# Patient Record
Sex: MALE | Race: BLACK OR AFRICAN AMERICAN | URBAN - METROPOLITAN AREA
[De-identification: names, ages, dates, MRNs, and addresses within clinical notes are randomized per-mention and may not be internally consistent; named-entity substitution may affect disease eponyms.]

---

## 2017-11-13 ENCOUNTER — HOSPITAL ENCOUNTER (OUTPATIENT)
Dept: LAB | Facility: CLINIC | Age: 57
Discharge: HOME | End: 2017-11-13
Attending: SURGERY | Admitting: ORTHOPAEDIC SURGERY
Payer: COMMERCIAL

## 2017-11-13 DIAGNOSIS — Z01.812 PRE-OPERATIVE LABORATORY EXAMINATION: ICD-10-CM

## 2017-11-13 LAB
MRSA DNA SPEC QL NAA+PROBE: NEGATIVE
SPECIMEN SOURCE: NORMAL

## 2017-11-13 PROCEDURE — 40000830 ZZHCL STATISTIC STAPH AUREUS METH RESIST SCREEN PCR: Performed by: ORTHOPAEDIC SURGERY

## 2017-11-13 PROCEDURE — 87640 STAPH A DNA AMP PROBE: CPT | Performed by: ORTHOPAEDIC SURGERY

## 2017-11-13 PROCEDURE — 40000829 ZZHCL STATISTIC STAPH AUREUS SUSCEPT SCREEN PCR: Performed by: ORTHOPAEDIC SURGERY

## 2017-11-13 PROCEDURE — 87641 MR-STAPH DNA AMP PROBE: CPT | Performed by: ORTHOPAEDIC SURGERY

## 2017-11-16 ENCOUNTER — TRANSFERRED RECORDS (OUTPATIENT)
Dept: HEALTH INFORMATION MANAGEMENT | Facility: CLINIC | Age: 57
End: 2017-11-16

## 2017-12-05 ENCOUNTER — ANESTHESIA EVENT (OUTPATIENT)
Dept: SURGERY | Facility: CLINIC | Age: 57
DRG: 470 | End: 2017-12-05
Payer: COMMERCIAL

## 2017-12-05 ENCOUNTER — ANESTHESIA (OUTPATIENT)
Dept: SURGERY | Facility: CLINIC | Age: 57
DRG: 470 | End: 2017-12-05
Payer: COMMERCIAL

## 2017-12-05 ENCOUNTER — HOSPITAL ENCOUNTER (INPATIENT)
Facility: CLINIC | Age: 57
LOS: 2 days | Discharge: HOME | DRG: 470 | End: 2017-12-07
Attending: ORTHOPAEDIC SURGERY | Admitting: ORTHOPAEDIC SURGERY
Payer: COMMERCIAL

## 2017-12-05 DIAGNOSIS — Z96.651 HISTORY OF ARTHROPLASTY OF RIGHT KNEE: Primary | ICD-10-CM

## 2017-12-05 LAB
CREAT SERPL-MCNC: 0.98 MG/DL (ref 0.66–1.25)
GFR SERPL CREATININE-BSD FRML MDRD: 79 ML/MIN/1.7M2
HGB BLD-MCNC: 14.7 G/DL (ref 13.3–17.7)
POTASSIUM SERPL-SCNC: 3.6 MMOL/L (ref 3.4–5.3)

## 2017-12-05 PROCEDURE — 36000093 ZZH SURGERY LEVEL 4 1ST 30 MIN: Performed by: ORTHOPAEDIC SURGERY

## 2017-12-05 PROCEDURE — 85018 HEMOGLOBIN: CPT | Performed by: ANESTHESIOLOGY

## 2017-12-05 PROCEDURE — 12000007 ZZH R&B INTERMEDIATE

## 2017-12-05 PROCEDURE — 27210995 ZZH RX 272: Performed by: ORTHOPAEDIC SURGERY

## 2017-12-05 PROCEDURE — 36000063 ZZH SURGERY LEVEL 4 EA 15 ADDTL MIN: Performed by: ORTHOPAEDIC SURGERY

## 2017-12-05 PROCEDURE — 25000128 H RX IP 250 OP 636: Performed by: PHYSICIAN ASSISTANT

## 2017-12-05 PROCEDURE — 27210794 ZZH OR GENERAL SUPPLY STERILE: Performed by: ORTHOPAEDIC SURGERY

## 2017-12-05 PROCEDURE — 84132 ASSAY OF SERUM POTASSIUM: CPT | Performed by: ANESTHESIOLOGY

## 2017-12-05 PROCEDURE — 27110028 ZZH OR GENERAL SUPPLY NON-STERILE: Performed by: ORTHOPAEDIC SURGERY

## 2017-12-05 PROCEDURE — 71000012 ZZH RECOVERY PHASE 1 LEVEL 1 FIRST HR: Performed by: ORTHOPAEDIC SURGERY

## 2017-12-05 PROCEDURE — 37000009 ZZH ANESTHESIA TECHNICAL FEE, EACH ADDTL 15 MIN: Performed by: ORTHOPAEDIC SURGERY

## 2017-12-05 PROCEDURE — 25000128 H RX IP 250 OP 636: Performed by: ORTHOPAEDIC SURGERY

## 2017-12-05 PROCEDURE — 25000128 H RX IP 250 OP 636: Performed by: ANESTHESIOLOGY

## 2017-12-05 PROCEDURE — 25800025 ZZH RX 258: Performed by: ORTHOPAEDIC SURGERY

## 2017-12-05 PROCEDURE — 25000128 H RX IP 250 OP 636: Performed by: NURSE ANESTHETIST, CERTIFIED REGISTERED

## 2017-12-05 PROCEDURE — 0SRC0J9 REPLACEMENT OF RIGHT KNEE JOINT WITH SYNTHETIC SUBSTITUTE, CEMENTED, OPEN APPROACH: ICD-10-PCS | Performed by: ORTHOPAEDIC SURGERY

## 2017-12-05 PROCEDURE — 25000128 H RX IP 250 OP 636

## 2017-12-05 PROCEDURE — 27810169 ZZH OR IMPLANT GENERAL: Performed by: ORTHOPAEDIC SURGERY

## 2017-12-05 PROCEDURE — 82565 ASSAY OF CREATININE: CPT | Performed by: ANESTHESIOLOGY

## 2017-12-05 PROCEDURE — 36415 COLL VENOUS BLD VENIPUNCTURE: CPT | Performed by: ANESTHESIOLOGY

## 2017-12-05 PROCEDURE — 25000566 ZZH SEVOFLURANE, EA 15 MIN: Performed by: ORTHOPAEDIC SURGERY

## 2017-12-05 PROCEDURE — C1776 JOINT DEVICE (IMPLANTABLE): HCPCS | Performed by: ORTHOPAEDIC SURGERY

## 2017-12-05 PROCEDURE — 37000008 ZZH ANESTHESIA TECHNICAL FEE, 1ST 30 MIN: Performed by: ORTHOPAEDIC SURGERY

## 2017-12-05 PROCEDURE — 40000171 ZZH STATISTIC PRE-PROCEDURE ASSESSMENT III: Performed by: ORTHOPAEDIC SURGERY

## 2017-12-05 PROCEDURE — 25000125 ZZHC RX 250: Performed by: NURSE ANESTHETIST, CERTIFIED REGISTERED

## 2017-12-05 PROCEDURE — 25000125 ZZHC RX 250

## 2017-12-05 PROCEDURE — 25000132 ZZH RX MED GY IP 250 OP 250 PS 637: Performed by: ORTHOPAEDIC SURGERY

## 2017-12-05 PROCEDURE — 25000125 ZZHC RX 250: Performed by: ANESTHESIOLOGY

## 2017-12-05 PROCEDURE — 71000013 ZZH RECOVERY PHASE 1 LEVEL 1 EA ADDTL HR: Performed by: ORTHOPAEDIC SURGERY

## 2017-12-05 DEVICE — IMP COMP PATELLA GENESIS II 9X35MM 71420578: Type: IMPLANTABLE DEVICE | Site: KNEE | Status: FUNCTIONAL

## 2017-12-05 DEVICE — IMP BONE CEMENT SIMPLEX W/GENTAMICIN 6195-1-001: Type: IMPLANTABLE DEVICE | Site: KNEE | Status: FUNCTIONAL

## 2017-12-05 DEVICE — BONE CEMENT RADIOPAQUE SIMPLEX HV FULL DOSE 6194-1-001: Type: IMPLANTABLE DEVICE | Site: KNEE | Status: FUNCTIONAL

## 2017-12-05 DEVICE — IMP COMP FEMORAL S&N LEGION PS NP SZ7 RT 71423237: Type: IMPLANTABLE DEVICE | Site: KNEE | Status: FUNCTIONAL

## 2017-12-05 DEVICE — IMP INSERT TIB S&N LGN PS HI FLEX XLPE SZ5-6 15MM 71453224: Type: IMPLANTABLE DEVICE | Site: KNEE | Status: FUNCTIONAL

## 2017-12-05 DEVICE — IMP BASEPLATE TIBIAL GENESIS II SZ 6 RT TI 71420188: Type: IMPLANTABLE DEVICE | Site: KNEE | Status: FUNCTIONAL

## 2017-12-05 RX ORDER — HYDROMORPHONE HYDROCHLORIDE 1 MG/ML
.3-.5 INJECTION, SOLUTION INTRAMUSCULAR; INTRAVENOUS; SUBCUTANEOUS EVERY 5 MIN PRN
Status: DISCONTINUED | OUTPATIENT
Start: 2017-12-05 | End: 2017-12-05 | Stop reason: HOSPADM

## 2017-12-05 RX ORDER — SODIUM CHLORIDE, SODIUM LACTATE, POTASSIUM CHLORIDE, CALCIUM CHLORIDE 600; 310; 30; 20 MG/100ML; MG/100ML; MG/100ML; MG/100ML
INJECTION, SOLUTION INTRAVENOUS CONTINUOUS
Status: DISCONTINUED | OUTPATIENT
Start: 2017-12-05 | End: 2017-12-05 | Stop reason: HOSPADM

## 2017-12-05 RX ORDER — ONDANSETRON 2 MG/ML
4 INJECTION INTRAMUSCULAR; INTRAVENOUS EVERY 6 HOURS PRN
Status: DISCONTINUED | OUTPATIENT
Start: 2017-12-05 | End: 2017-12-07 | Stop reason: HOSPADM

## 2017-12-05 RX ORDER — ACETAMINOPHEN 325 MG/1
975 TABLET ORAL EVERY 8 HOURS
Status: DISCONTINUED | OUTPATIENT
Start: 2017-12-05 | End: 2017-12-07 | Stop reason: HOSPADM

## 2017-12-05 RX ORDER — FENTANYL CITRATE 50 UG/ML
INJECTION, SOLUTION INTRAMUSCULAR; INTRAVENOUS PRN
Status: DISCONTINUED | OUTPATIENT
Start: 2017-12-05 | End: 2017-12-05

## 2017-12-05 RX ORDER — ATORVASTATIN CALCIUM 10 MG/1
10 TABLET, FILM COATED ORAL EVERY MORNING
Status: DISCONTINUED | OUTPATIENT
Start: 2017-12-06 | End: 2017-12-07 | Stop reason: HOSPADM

## 2017-12-05 RX ORDER — CEFAZOLIN SODIUM 2 G/100ML
2 INJECTION, SOLUTION INTRAVENOUS
Status: COMPLETED | OUTPATIENT
Start: 2017-12-05 | End: 2017-12-05

## 2017-12-05 RX ORDER — ONDANSETRON 2 MG/ML
4 INJECTION INTRAMUSCULAR; INTRAVENOUS EVERY 30 MIN PRN
Status: DISCONTINUED | OUTPATIENT
Start: 2017-12-05 | End: 2017-12-05 | Stop reason: HOSPADM

## 2017-12-05 RX ORDER — CHLORTHALIDONE 25 MG/1
25 TABLET ORAL EVERY MORNING
Status: DISCONTINUED | OUTPATIENT
Start: 2017-12-06 | End: 2017-12-07 | Stop reason: HOSPADM

## 2017-12-05 RX ORDER — LIDOCAINE HYDROCHLORIDE 20 MG/ML
INJECTION, SOLUTION INFILTRATION; PERINEURAL PRN
Status: DISCONTINUED | OUTPATIENT
Start: 2017-12-05 | End: 2017-12-05

## 2017-12-05 RX ORDER — KETOROLAC TROMETHAMINE 30 MG/ML
30 INJECTION, SOLUTION INTRAMUSCULAR; INTRAVENOUS EVERY 6 HOURS
Status: DISCONTINUED | OUTPATIENT
Start: 2017-12-05 | End: 2017-12-05

## 2017-12-05 RX ORDER — ONDANSETRON 4 MG/1
4 TABLET, ORALLY DISINTEGRATING ORAL EVERY 6 HOURS PRN
Status: DISCONTINUED | OUTPATIENT
Start: 2017-12-05 | End: 2017-12-07 | Stop reason: HOSPADM

## 2017-12-05 RX ORDER — DEXTROSE MONOHYDRATE, SODIUM CHLORIDE, AND POTASSIUM CHLORIDE 50; 1.49; 4.5 G/1000ML; G/1000ML; G/1000ML
INJECTION, SOLUTION INTRAVENOUS CONTINUOUS
Status: DISCONTINUED | OUTPATIENT
Start: 2017-12-05 | End: 2017-12-07 | Stop reason: HOSPADM

## 2017-12-05 RX ORDER — ONDANSETRON 2 MG/ML
INJECTION INTRAMUSCULAR; INTRAVENOUS PRN
Status: DISCONTINUED | OUTPATIENT
Start: 2017-12-05 | End: 2017-12-05

## 2017-12-05 RX ORDER — DIAZEPAM 5 MG
5 TABLET ORAL EVERY 6 HOURS PRN
Status: DISCONTINUED | OUTPATIENT
Start: 2017-12-05 | End: 2017-12-07 | Stop reason: HOSPADM

## 2017-12-05 RX ORDER — FENTANYL CITRATE 50 UG/ML
50 INJECTION, SOLUTION INTRAMUSCULAR; INTRAVENOUS
Status: COMPLETED | OUTPATIENT
Start: 2017-12-05 | End: 2017-12-05

## 2017-12-05 RX ORDER — MAGNESIUM HYDROXIDE 1200 MG/15ML
LIQUID ORAL PRN
Status: DISCONTINUED | OUTPATIENT
Start: 2017-12-05 | End: 2017-12-05 | Stop reason: HOSPADM

## 2017-12-05 RX ORDER — AMOXICILLIN 250 MG
1-2 CAPSULE ORAL 2 TIMES DAILY
Status: DISCONTINUED | OUTPATIENT
Start: 2017-12-05 | End: 2017-12-07 | Stop reason: HOSPADM

## 2017-12-05 RX ORDER — KETOROLAC TROMETHAMINE 30 MG/ML
30 INJECTION, SOLUTION INTRAMUSCULAR; INTRAVENOUS EVERY 6 HOURS
Status: COMPLETED | OUTPATIENT
Start: 2017-12-05 | End: 2017-12-06

## 2017-12-05 RX ORDER — KETOROLAC TROMETHAMINE 30 MG/ML
INJECTION, SOLUTION INTRAMUSCULAR; INTRAVENOUS PRN
Status: DISCONTINUED | OUTPATIENT
Start: 2017-12-05 | End: 2017-12-05

## 2017-12-05 RX ORDER — HYDROMORPHONE HYDROCHLORIDE 1 MG/ML
INJECTION, SOLUTION INTRAMUSCULAR; INTRAVENOUS; SUBCUTANEOUS
Status: COMPLETED
Start: 2017-12-05 | End: 2017-12-05

## 2017-12-05 RX ORDER — ONDANSETRON 4 MG/1
4 TABLET, ORALLY DISINTEGRATING ORAL EVERY 30 MIN PRN
Status: DISCONTINUED | OUTPATIENT
Start: 2017-12-05 | End: 2017-12-05 | Stop reason: HOSPADM

## 2017-12-05 RX ORDER — ACETAMINOPHEN 325 MG/1
650 TABLET ORAL EVERY 4 HOURS PRN
Status: DISCONTINUED | OUTPATIENT
Start: 2017-12-08 | End: 2017-12-07 | Stop reason: HOSPADM

## 2017-12-05 RX ORDER — CEFAZOLIN SODIUM 2 G/100ML
2 INJECTION, SOLUTION INTRAVENOUS EVERY 8 HOURS
Status: COMPLETED | OUTPATIENT
Start: 2017-12-05 | End: 2017-12-06

## 2017-12-05 RX ORDER — HYDROMORPHONE HYDROCHLORIDE 1 MG/ML
.5-1 INJECTION, SOLUTION INTRAMUSCULAR; INTRAVENOUS; SUBCUTANEOUS
Status: DISCONTINUED | OUTPATIENT
Start: 2017-12-05 | End: 2017-12-07 | Stop reason: HOSPADM

## 2017-12-05 RX ORDER — LIDOCAINE 40 MG/G
CREAM TOPICAL
Status: DISCONTINUED | OUTPATIENT
Start: 2017-12-05 | End: 2017-12-07 | Stop reason: HOSPADM

## 2017-12-05 RX ORDER — EPHEDRINE SULFATE 50 MG/ML
INJECTION, SOLUTION INTRAMUSCULAR; INTRAVENOUS; SUBCUTANEOUS PRN
Status: DISCONTINUED | OUTPATIENT
Start: 2017-12-05 | End: 2017-12-05

## 2017-12-05 RX ORDER — ASPIRIN 325 MG
325 TABLET ORAL 2 TIMES DAILY
Status: DISCONTINUED | OUTPATIENT
Start: 2017-12-05 | End: 2017-12-07 | Stop reason: HOSPADM

## 2017-12-05 RX ORDER — PROPOFOL 10 MG/ML
INJECTION, EMULSION INTRAVENOUS PRN
Status: DISCONTINUED | OUTPATIENT
Start: 2017-12-05 | End: 2017-12-05

## 2017-12-05 RX ORDER — FENTANYL CITRATE 50 UG/ML
25-50 INJECTION, SOLUTION INTRAMUSCULAR; INTRAVENOUS
Status: DISCONTINUED | OUTPATIENT
Start: 2017-12-05 | End: 2017-12-05 | Stop reason: HOSPADM

## 2017-12-05 RX ORDER — OXYCODONE HCL 10 MG/1
10 TABLET, FILM COATED, EXTENDED RELEASE ORAL EVERY 12 HOURS
Status: COMPLETED | OUTPATIENT
Start: 2017-12-05 | End: 2017-12-07

## 2017-12-05 RX ORDER — NALOXONE HYDROCHLORIDE 0.4 MG/ML
.1-.4 INJECTION, SOLUTION INTRAMUSCULAR; INTRAVENOUS; SUBCUTANEOUS
Status: DISCONTINUED | OUTPATIENT
Start: 2017-12-05 | End: 2017-12-07 | Stop reason: HOSPADM

## 2017-12-05 RX ORDER — OXYCODONE HYDROCHLORIDE 5 MG/1
5-10 TABLET ORAL
Status: DISCONTINUED | OUTPATIENT
Start: 2017-12-05 | End: 2017-12-07 | Stop reason: HOSPADM

## 2017-12-05 RX ORDER — DEXAMETHASONE SODIUM PHOSPHATE 4 MG/ML
INJECTION, SOLUTION INTRA-ARTICULAR; INTRALESIONAL; INTRAMUSCULAR; INTRAVENOUS; SOFT TISSUE PRN
Status: DISCONTINUED | OUTPATIENT
Start: 2017-12-05 | End: 2017-12-05

## 2017-12-05 RX ADMIN — LIDOCAINE HYDROCHLORIDE 0.2 ML: 10 INJECTION, SOLUTION EPIDURAL; INFILTRATION; INTRACAUDAL; PERINEURAL at 09:52

## 2017-12-05 RX ADMIN — FENTANYL CITRATE 50 MCG: 50 INJECTION INTRAMUSCULAR; INTRAVENOUS at 13:54

## 2017-12-05 RX ADMIN — SENNOSIDES AND DOCUSATE SODIUM 1 TABLET: 8.6; 5 TABLET ORAL at 21:36

## 2017-12-05 RX ADMIN — Medication 0.5 MG: at 15:56

## 2017-12-05 RX ADMIN — MIDAZOLAM HYDROCHLORIDE 2 MG: 1 INJECTION, SOLUTION INTRAMUSCULAR; INTRAVENOUS at 11:59

## 2017-12-05 RX ADMIN — Medication 5 MG: at 12:38

## 2017-12-05 RX ADMIN — LIDOCAINE HYDROCHLORIDE: 20 JELLY TOPICAL at 18:02

## 2017-12-05 RX ADMIN — FENTANYL CITRATE 100 MCG: 50 INJECTION, SOLUTION INTRAMUSCULAR; INTRAVENOUS at 10:10

## 2017-12-05 RX ADMIN — PROPOFOL 200 MG: 10 INJECTION, EMULSION INTRAVENOUS at 12:08

## 2017-12-05 RX ADMIN — ACETAMINOPHEN 975 MG: 325 TABLET, FILM COATED ORAL at 21:36

## 2017-12-05 RX ADMIN — DEXAMETHASONE SODIUM PHOSPHATE 4 MG: 4 INJECTION, SOLUTION INTRA-ARTICULAR; INTRALESIONAL; INTRAMUSCULAR; INTRAVENOUS; SOFT TISSUE at 12:14

## 2017-12-05 RX ADMIN — CEFAZOLIN SODIUM 2 G: 2 INJECTION, SOLUTION INTRAVENOUS at 19:52

## 2017-12-05 RX ADMIN — HYDROMORPHONE HYDROCHLORIDE 0.5 MG: 1 INJECTION, SOLUTION INTRAMUSCULAR; INTRAVENOUS; SUBCUTANEOUS at 14:15

## 2017-12-05 RX ADMIN — SODIUM CHLORIDE, POTASSIUM CHLORIDE, SODIUM LACTATE AND CALCIUM CHLORIDE: 600; 310; 30; 20 INJECTION, SOLUTION INTRAVENOUS at 13:10

## 2017-12-05 RX ADMIN — OXYCODONE HYDROCHLORIDE 10 MG: 5 TABLET ORAL at 18:05

## 2017-12-05 RX ADMIN — PROPOFOL 30 MG: 10 INJECTION, EMULSION INTRAVENOUS at 13:08

## 2017-12-05 RX ADMIN — MIDAZOLAM HYDROCHLORIDE 2 MG: 1 INJECTION, SOLUTION INTRAMUSCULAR; INTRAVENOUS at 10:10

## 2017-12-05 RX ADMIN — FENTANYL CITRATE 25 MCG: 50 INJECTION INTRAMUSCULAR; INTRAVENOUS at 14:24

## 2017-12-05 RX ADMIN — CEFAZOLIN SODIUM 2 G: 2 INJECTION, SOLUTION INTRAVENOUS at 12:13

## 2017-12-05 RX ADMIN — LIDOCAINE HYDROCHLORIDE 100 MG: 20 INJECTION, SOLUTION INFILTRATION; PERINEURAL at 12:08

## 2017-12-05 RX ADMIN — KETOROLAC TROMETHAMINE 30 MG: 30 INJECTION, SOLUTION INTRAMUSCULAR at 13:24

## 2017-12-05 RX ADMIN — FENTANYL CITRATE 100 MCG: 50 INJECTION, SOLUTION INTRAMUSCULAR; INTRAVENOUS at 12:08

## 2017-12-05 RX ADMIN — PROPOFOL 60 MG: 10 INJECTION, EMULSION INTRAVENOUS at 13:06

## 2017-12-05 RX ADMIN — OXYCODONE HYDROCHLORIDE 10 MG: 10 TABLET, FILM COATED, EXTENDED RELEASE ORAL at 18:05

## 2017-12-05 RX ADMIN — HYDROMORPHONE HYDROCHLORIDE 0.5 MG: 1 INJECTION, SOLUTION INTRAMUSCULAR; INTRAVENOUS; SUBCUTANEOUS at 14:35

## 2017-12-05 RX ADMIN — HYDROMORPHONE HYDROCHLORIDE 0.5 MG: 1 INJECTION, SOLUTION INTRAMUSCULAR; INTRAVENOUS; SUBCUTANEOUS at 13:48

## 2017-12-05 RX ADMIN — PROPOFOL 10 MG: 10 INJECTION, EMULSION INTRAVENOUS at 13:09

## 2017-12-05 RX ADMIN — KETOROLAC TROMETHAMINE 30 MG: 30 INJECTION, SOLUTION INTRAMUSCULAR at 13:23

## 2017-12-05 RX ADMIN — SODIUM CHLORIDE, POTASSIUM CHLORIDE, SODIUM LACTATE AND CALCIUM CHLORIDE: 600; 310; 30; 20 INJECTION, SOLUTION INTRAVENOUS at 09:53

## 2017-12-05 RX ADMIN — ASPIRIN 325 MG ORAL TABLET 325 MG: 325 PILL ORAL at 21:36

## 2017-12-05 RX ADMIN — POTASSIUM CHLORIDE, DEXTROSE MONOHYDRATE AND SODIUM CHLORIDE: 150; 5; 450 INJECTION, SOLUTION INTRAVENOUS at 16:00

## 2017-12-05 RX ADMIN — ONDANSETRON 4 MG: 2 INJECTION INTRAMUSCULAR; INTRAVENOUS at 13:20

## 2017-12-05 RX ADMIN — OXYCODONE HYDROCHLORIDE 10 MG: 5 TABLET ORAL at 21:36

## 2017-12-05 RX ADMIN — KETOROLAC TROMETHAMINE 30 MG: 30 INJECTION, SOLUTION INTRAMUSCULAR at 19:52

## 2017-12-05 NOTE — ANESTHESIA CARE TRANSFER NOTE
Patient: Gasper Chacon    Procedure(s):  RIGHT TOTAL KNEE ARTHROPLASTY  - Wound Class: I-Clean    Diagnosis: DJD RIGHT KNEE   Diagnosis Additional Information: No value filed.    Anesthesia Type:   General, LMA     Note:  Airway :Face Mask  Patient transferred to:PACU  Comments: Level of Conscious:Awake  Vital Signs   BP:116/72   HR:85   RR:18   O2 Saturation:98   Oxygen LPM:8   Temp:98.3  Dentition:Unchanged from preop  Patient Status:Stable  Report to PACU RN.Handoff Report: Identifed the Patient, Identified the Reponsible Provider, Reviewed the pertinent medical history, Discussed the surgical course, Reviewed Intra-OP anesthesia mangement and issues during anesthesia, Set expectations for post-procedure period and Allowed opportunity for questions and acknowledgement of understanding      Vitals: (Last set prior to Anesthesia Care Transfer)    CRNA VITALS  12/5/2017 1311 - 12/5/2017 1347      12/5/2017             Resp Rate (set): 10                Electronically Signed By: Christine Marie Volp Hodgkins, CRNA, APRN CRNA  December 5, 2017  1:47 PM

## 2017-12-05 NOTE — IP AVS SNAPSHOT
[address] [facility]    [address] [place] [address]    Phone:  [phone]                                       After Visit Summary   12/5/2017    Gasper Chacon    MRN: [id number]           After Visit Summary Signature Page     I have received my discharge instructions, and my questions have been answered. I have discussed any challenges I see with this plan with the nurse or doctor.    ..........................................................................................................................................  Patient/Patient Representative Signature      ..........................................................................................................................................  Patient Representative Print Name and Relationship to Patient    ..................................................               ................................................  Date                                            Time    ..........................................................................................................................................  Reviewed by Signature/Title    ...................................................              ..............................................  Date                                                            Time

## 2017-12-05 NOTE — IP AVS SNAPSHOT
MRN:[id number]                      After Visit Summary   12/5/2017    Gasper Chacon    MRN: [id number]           Thank you!     Thank you for choosing [facility] for your care. Our goal is always to provide you with excellent care. Hearing back from our patients is one way we can continue to improve our services. Please take a few minutes to complete the written survey that you may receive in the mail after you visit with us. Thank you!        Patient Information     Date Of Birth          1960        Designated Caregiver       Most Recent Value    Caregiver    Will someone help with your care after discharge? no [pt primary, wife assist prn]      About your hospital stay     You were admitted on:  December 5, 2017 You last received care in the:  [place] [address] [facility]    You were discharged on:  December 7, 2017        Reason for your hospital stay       Right total knee                  Who to Call     For medical emergencies, please call 911.  For non-urgent questions about your medical care, please call your primary care provider or clinic, [phone]  For questions related to your surgery, please call your surgery clinic        Attending Provider     Provider Specialty    Luis Enrique Begum MD Orthopedics       Primary Care Provider Office Phone # Fax #    Luiza NIGEL Bender [phone]      After Care Instructions     Wound care and dressings       Instructions to care for your wound at home: daily dressing changes  Redress 4x4,abd,thigh high nigel, may shower.                  Follow-up Appointments     Follow-up and recommended labs and tests        arranged                  Your next 10 appointments already scheduled     Dec 08, 2017  1:20 PM CST   (Arrive by 1:05 PM)   CARLOS Extremity with Sudha Mack, PT   [place] [facility] ([facility] [place]  )    [address]  "[address]   [phone]            Dec 11, 2017  1:20 PM CST   CARLOS Extremity with Sudha Mack, PT   [place] [facility] ([facility]  )    [address] [place] [address] [address]  [facility] [address]   [phone]            Dec 14, 2017  4:50 PM CST   CARLOS Extremity with Francisco Daugherty PT   [facility] ([facility]  )    [address] [place] [address]  [facility] [address]   [phone]              Further instructions from your care team       TOTAL KNEE REPLACEMENT TAKE HOME INSTRUCTIONS  Your surgeon will answer any questions about your progress. General guidelines for your care are listed below. Your surgeon may give additional instructions for your care at home. Please follow them carefully    Activity Level  1. Physical activity may be resumed gradually according to your comfort level and your surgeon s instructions. Follow your exercise program as instructed by your therapist. Do exercises at least twice a day. you may ice your knee after exercising.  2. Complete exercises two hours before bedtime to minimize the effect pain may have on sleep.  Refer to pages 19-22 of you \"Total Knee Replacement\" booklet for details  3. Do not wear your knee immobilizer unless your doctor has specifically told you to continue it.    Good Health Practices  1. Maintain an adequate fluid intake and eat a well balanced diet.  2. Be sure to include the basic food groups, such as dairy products, meat/fish, vegetables, and fruit. Each of these foods contribute to your wound healing and increasing your strength.  3. Surgery, decreased activity and pain medication all contribute to a decrease in bowel activity that can result in constipation. It is recommended that you increase your liquid intake, add fiber to your diet, increase activity, and decrease pain medication use. If you have any problems, notify your physician.  4. " "Wear your anti-embolism stockings day and night until seen by your surgeon if you were issued these at discharge.  (Not all patients will have anti-embolism stockings) Remove twice a day for one hour at a time. You may hand wash and air dry your stockings.  5. Notify your dentist of your total knee surgery and call your dentist one week before a dental appointment for antibiotics, if your dentist will not prescribe antibiotics then call your surgeon to ask for next steps.      Incision/Dressing Care  1. Keep incision clean and dry per surgeons instructions  2. Cover incision if you are still having drainage.  3.  If you have a waterproof dressing __________________________   Shower.    Things to Watch For  1. Check incision daily for increased redness, tenderness, swelling, or drainage along the incision line. If these occur, please notify your doctor. Also, call if you develop a fever above 101 .  2. Please notify your doctor if you experience any calf pain and/or if you have surgical pain not relieved by the pain medication prescribed by your doctor.        Pending Results     No orders found from 12/3/2017 to 12/6/2017.            Statement of Approval     Ordered          12/07/17 0855  I have reviewed and agree with all the recommendations and orders detailed in this document.  EFFECTIVE NOW     Approved and electronically signed by:  Luis Enrique Begum MD             Admission Information     Date & Time Provider Department Dept. Phone    12/5/2017 Luis Enrique Begum MD [place] [address] [facility] [phone]      Your Vitals Were     Blood Pressure Pulse Temperature Respirations Height Weight    123/72 (BP Location: Right arm) 74 98.8  F (37.1  C) (Oral) 16 1.753 m (5' 9\") 89.4 kg (197 lb 1.5 oz)    Pulse Oximetry BMI (Body Mass Index)                98% 29.11 kg/m2          MyChart Information     [employer] lets you send messages to your doctor, view your test results, renew your " "prescriptions, schedule appointments and more. To sign up, go to [email or web address] . Click on \"Log in\" on the left side of the screen, which will take you to the Welcome page. Then click on \"Sign up Now\" on the right side of the page.     You will be asked to enter the access code listed below, as well as some personal information. Please follow the directions to create your username and password.     Your access code is: [email or web address]  Expires: 3/7/2018  9:56 AM     Your access code will  in 90 days. If you need help or a new code, please call your [facility] clinic or [phone].        Care EveryWhere ID     This is your Care EveryWhere ID. This could be used by other organizations to access your [facility] medical records  [id number]        Equal Access to Services     JACKY SOTO : Liseth Henning, ishan kaye, noe ruffin, andre woods . So [facility] [phone].    ATENCIÓN: Si habla español, tiene a morales disposición servicios gratuitos de asistencia lingüística. Rafiq al [phone].    We comply with applicable federal civil rights laws and [place] laws. We do not discriminate on the basis of race, color, national origin, age, disability, sex, sexual orientation, or gender identity.               Review of your medicines      START taking        Dose / Directions    aspirin 325 MG tablet        Dose:  325 mg   Take 1 tablet (325 mg) by mouth 2 times daily   Quantity:  20 tablet   Refills:  0       diazepam 5 MG tablet   Commonly known as:  VALIUM        Dose:  5 mg   Take 1 tablet (5 mg) by mouth every 6 hours as needed for anxiety   Quantity:  30 tablet   Refills:  0       ketorolac 10 MG tablet   Commonly known as:  TORADOL        Dose:  10 mg   Take 1 tablet (10 mg) by mouth every 6 hours as needed   Quantity:  20 tablet   Refills:  0       order for DME        Equipment being ordered: Walker Wheels () and Walker () Treatment " Diagnosis: difficulty walking   Quantity:  1 each   Refills:  0       oxyCODONE IR 5 MG tablet   Commonly known as:  ROXICODONE        Dose:  5-10 mg   Take 1-2 tablets (5-10 mg) by mouth every 3 hours as needed for moderate to severe pain   Quantity:  40 tablet   Refills:  0       senna-docusate 8.6-50 MG per tablet   Commonly known as:  SENOKOT-S;PERICOLACE        Dose:  1-2 tablet   Take 1-2 tablets by mouth 2 times daily   Quantity:  20 tablet   Refills:  0         CONTINUE these medicines which may have CHANGED, or have new prescriptions. If we are uncertain of the size of tablets/capsules you have at home, strength may be listed as something that might have changed.        Dose / Directions    * TYLENOL PO   This may have changed:  Another medication with the same name was added. Make sure you understand how and when to take each.        Dose:  1000 mg   Take 1,000 mg by mouth daily as needed for mild pain or fever   Refills:  0       * acetaminophen 325 MG tablet   Commonly known as:  TYLENOL   This may have changed:  You were already taking a medication with the same name, and this prescription was added. Make sure you understand how and when to take each.        Dose:  975 mg   Take 3 tablets (975 mg) by mouth every 8 hours   Quantity:  100 tablet   Refills:  0       * Notice:  This list has 2 medication(s) that are the same as other medications prescribed for you. Read the directions carefully, and ask your doctor or other care provider to review them with you.      CONTINUE these medicines which have NOT CHANGED        Dose / Directions    CHLORTHALIDONE PO        Dose:  25 mg   Take 25 mg by mouth every morning   Refills:  0       GREEN TEA EXTRACT PO        Dose:  2 capsule   Take 2 capsules by mouth every morning   Refills:  0       LIPITOR PO        Dose:  10 mg   Take 10 mg by mouth every morning   Refills:  0       NONFORMULARY        Dose:  8 oz   Take 8 oz by mouth every morning (Patient mixes 2  Tbls lemon jiuce,2 Tbls Apple Cider Vinegar, 1 Tbls Cinnamon & 1 Tbls honey mixed in 8 ounces of water daily)   Refills:  0       prenatal multivitamin plus iron 27-0.8 MG Tabs per tablet        Dose:  1 tablet   Take 1 tablet by mouth every morning   Refills:  0       VITAMIN D (CHOLECALCIFEROL) PO        Dose:  5000 Units   Take 5,000 Units by mouth every morning   Refills:  0            Where to get your medicines      These medications were sent to [id number] [place] [facility] [place] [place] [facility] [address]  [address] [address] [facility] [address]     Phone:  [phone]     acetaminophen 325 MG tablet    aspirin 325 MG tablet    ketorolac 10 MG tablet    senna-docusate 8.6-50 MG per tablet         Some of these will need a paper prescription and others can be bought over the counter. Ask your nurse if you have questions.     Bring a paper prescription for each of these medications     diazepam 5 MG tablet    order for DME    oxyCODONE IR 5 MG tablet                Protect others around you: Learn how to safely use, store and throw away your medicines at www.disposemymeds.org.             Medication List: This is a list of all your medications and when to take them. Check marks below indicate your daily home schedule. Keep this list as a reference.      Medications           Morning Afternoon Evening Bedtime As Needed    aspirin 325 MG tablet   Take 1 tablet (325 mg) by mouth 2 times daily   Last time this was given:  325 mg on 12/7/2017  8:30 AM                                CHLORTHALIDONE PO   Take 25 mg by mouth every morning   Last time this was given:  25 mg on 12/7/2017  8:30 AM   Next Dose Due:  12/8/17 morning            12/8/17                       diazepam 5 MG tablet   Commonly known as:  VALIUM   Take 1 tablet (5 mg) by mouth every 6 hours as needed for anxiety   Last time this was given:  5 mg on 12/7/2017 12:23 PM   Next Dose Due:  Today at 630pm if needed                                    GREEN TEA EXTRACT PO   Take 2 capsules by mouth every morning   Next Dose Due:  Resume at discharge.                                ketorolac 10 MG tablet   Commonly known as:  TORADOL   Take 1 tablet (10 mg) by mouth every 6 hours as needed   Next Dose Due:  anytime                                LIPITOR PO   Take 10 mg by mouth every morning   Last time this was given:  10 mg on 12/7/2017  8:30 AM   Next Dose Due:  12/8/17 morning            12/8/17                       NONFORMULARY   Take 8 oz by mouth every morning (Patient mixes 2 Tbls lemon jiuce,2 Tbls Apple Cider Vinegar, 1 Tbls Cinnamon & 1 Tbls honey mixed in 8 ounces of water daily)   Next Dose Due:  Resume at discharge.                                order for DME   Equipment being ordered: Walker Wheels () and Walker () Treatment Diagnosis: difficulty walking                                oxyCODONE IR 5 MG tablet   Commonly known as:  ROXICODONE   Take 1-2 tablets (5-10 mg) by mouth every 3 hours as needed for moderate to severe pain   Last time this was given:  10 mg on 12/7/2017  3:34 PM   Next Dose Due:  Today at 630pm if needed                                   prenatal multivitamin plus iron 27-0.8 MG Tabs per tablet   Take 1 tablet by mouth every morning   Next Dose Due:  Resume at discharge.                                senna-docusate 8.6-50 MG per tablet   Commonly known as:  SENOKOT-S;PERICOLACE   Take 1-2 tablets by mouth 2 times daily   Last time this was given:  2 tablets on 12/7/2017  8:30 AM                                * TYLENOL PO   Take 1,000 mg by mouth daily as needed for mild pain or fever   Last time this was given:  975 mg on 12/7/2017  2:19 PM                                   * acetaminophen 325 MG tablet   Commonly known as:  TYLENOL   Take 3 tablets (975 mg) by mouth every 8 hours   Last time this was given:  975 mg on 12/7/2017  2:19 PM                                VITAMIN D (CHOLECALCIFEROL) PO   Take  5,000 Units by mouth every morning   Next Dose Due:  Resume at discharge.                                * Notice:  This list has 2 medication(s) that are the same as other medications prescribed for you. Read the directions carefully, and ask your doctor or other care provider to review them with you.

## 2017-12-05 NOTE — PROGRESS NOTES
Admission medication history interview status for the 12/5/2017  admission is complete. See EPIC admission navigator for prior to admission medications     Medication history source reliability:Good    Medication history interview source(s):Patient    Medication history resources (including written lists, pill bottles, clinic record):None    Primary pharmacy.[facility], [place]    Additional medication history information not noted on PTA med list :None    Time spent in this activity: 30 minutes    Prior to Admission medications    Medication Sig Last Dose Taking? Auth Provider   Atorvastatin Calcium (LIPITOR PO) Take 10 mg by mouth every morning 12/5/2017 at 0700 Yes Reported, Patient   CHLORTHALIDONE PO Take 25 mg by mouth every morning 12/5/2017 at 0700 Yes Reported, Patient   Green Tea, Camillia sinensis, (GREEN TEA EXTRACT PO) Take 2 capsules by mouth every morning Over 1 week ago at am Yes Reported, Patient   Acetaminophen (TYLENOL PO) Take 1,000 mg by mouth daily as needed for mild pain or fever Over 1 month ago at prn Yes Reported, Patient   Prenatal Vit-Fe Fumarate-FA (PRENATAL MULTIVITAMIN PLUS IRON) 27-0.8 MG TABS per tablet Take 1 tablet by mouth every morning 12/5/2017 at 0700 Yes Reported, Patient   VITAMIN D, CHOLECALCIFEROL, PO Take 5,000 Units by mouth every morning 12/4/2017 at am Yes Reported, Patient   NONFORMULARY Take 8 oz by mouth every morning (Patient mixes 2 Tbls lemon jiuce,2 Tbls Apple Cider Vinegar, 1 Tbls Cinnamon & 1 Tbls honey mixed in 8 ounces of water daily) 12/4/2017 at 1800 Yes Reported, Patient

## 2017-12-05 NOTE — ANESTHESIA PREPROCEDURE EVALUATION
Procedure: Procedure(s):  ARTHROPLASTY KNEE  Preop diagnosis: DJD RIGHT KNEE     Allergies   Allergen Reactions     Pineapple Itching     tongue     Walnuts [Nuts] Itching     lips     Past Medical History:   Diagnosis Date     Abnormal EKG      Arthritis      Hyperlipidemia      Hyperplastic colon polyp      Hypertension      Sciatica      Past Surgical History:   Procedure Laterality Date     HERNIA REPAIR      x2     wisdom teeth       Social History   Substance Use Topics     Smoking status: Never Smoker     Smokeless tobacco: Never Used     Alcohol use Yes      Comment: 3/week     Prior to Admission medications    Medication Sig Start Date End Date Taking? Authorizing Provider   Atorvastatin Calcium (LIPITOR PO) Take 10 mg by mouth every morning   Yes Reported, Patient   CHLORTHALIDONE PO Take 25 mg by mouth every morning   Yes Reported, Patient   Green Tea, Camillia sinensis, (GREEN TEA EXTRACT PO) Take 2 capsules by mouth every morning   Yes Reported, Patient   Acetaminophen (TYLENOL PO) Take 1,000 mg by mouth daily as needed for mild pain or fever   Yes Reported, Patient   Prenatal Vit-Fe Fumarate-FA (PRENATAL MULTIVITAMIN PLUS IRON) 27-0.8 MG TABS per tablet Take 1 tablet by mouth every morning   Yes Reported, Patient   VITAMIN D, CHOLECALCIFEROL, PO Take 5,000 Units by mouth every morning   Yes Reported, Patient   NONFORMULARY Take 8 oz by mouth every morning (Patient mixes 2 Tbls lemon jiuce,2 Tbls Apple Cider Vinegar, 1 Tbls Cinnamon & 1 Tbls honey mixed in 8 ounces of water daily)   Yes Reported, Patient     Current Facility-Administered Medications Ordered in Epic   Medication Dose Route Frequency Last Rate Last Dose     ceFAZolin (ANCEF) intermittent infusion 2 g in 100 mL dextrose PRE-MIX  2 g Intravenous Pre-Op/Pre-procedure x 1 dose         ceFAZolin (ANCEF) intermittent infusion 1 g (pre-mix)  1 g Intravenous See Admin Instructions         lidocaine 1 % 1 mL  1 mL Other Q1H PRN   0.2 mL at  12/05/17 0952     lactated ringers infusion   Intravenous Continuous 25 mL/hr at 12/05/17 0953       fentaNYL (PF) (SUBLIMAZE) injection 50 mcg  50 mcg Intravenous Pre-Op/Pre-procedure x 1 dose         midazolam (VERSED) injection 2 mg  2 mg Intravenous Pre-Op/Pre-procedure x 1 dose         No current [facility]-ordered outpatient prescriptions on file.       lactated ringers 25 mL/hr at 12/05/17 0953     Wt Readings from Last 1 Encounters:   12/05/17 89.4 kg (197 lb 1.5 oz)     Temp Readings from Last 1 Encounters:   12/05/17 36.2  C (97.2  F) (Temporal)     BP Readings from Last 6 Encounters:   12/05/17 130/87     Pulse Readings from Last 4 Encounters:   No data found for Pulse     Resp Readings from Last 1 Encounters:   12/05/17 14     SpO2 Readings from Last 1 Encounters:   12/05/17 97%     Recent Labs   Lab Test  12/05/17 0917   POTASSIUM  3.6   CR  0.98     No results for input(s): AST, ALT, ALKPHOS, BILITOTAL, LIPASE in the last 56768 hours.  Recent Labs   Lab Test  12/05/17 0917   HGB  14.7     No results for input(s): ABO, RH in the last 01347 hours.  No results for input(s): INR, PTT in the last 02258 hours.   No results for input(s): TROPI in the last 03492 hours.  No results for input(s): PH, PCO2, PO2, HCO3 in the last 06226 hours.  No results for input(s): HCG in the last 59205 hours.  No results found for this or any previous visit (from the past 744 hour(s)).    RECENT LABS:   ECG:   ECHO:     Anesthesia Evaluation     . Pt has had prior anesthetic.     No history of anesthetic complications          ROS/MED HX    ENT/Pulmonary:       Neurologic:       Cardiovascular:     (+) hypertension----. : . . . :. .       METS/Exercise Tolerance:     Hematologic:         Musculoskeletal:   (+) arthritis, , , -       GI/Hepatic:        (-) GERD   Renal/Genitourinary:         Endo:         Psychiatric:         Infectious Disease:         Malignancy:         Other:                     Physical Exam  Normal systems:  cardiovascular and pulmonary    Airway   Mallampati: I  Neck ROM: full    Dental   (+) missing    Cardiovascular       Pulmonary                     Anesthesia Plan      History & Physical Review  History and physical reviewed and following examination; no interval change.    ASA Status:  2 .    NPO Status:  > 8 hours    Plan for General and LMA with Intravenous induction. Maintenance will be Balanced.    PONV prophylaxis:  Ondansetron (or other 5HT-3)       Postoperative Care  Postoperative pain management:  IV analgesics.      Consents  Anesthetic plan, risks, benefits and alternatives discussed with:  Patient..                          .

## 2017-12-05 NOTE — ANESTHESIA POSTPROCEDURE EVALUATION
Patient: Gasper Chacon    Procedure(s):  RIGHT TOTAL KNEE ARTHROPLASTY  - Wound Class: I-Clean    Diagnosis:DJD RIGHT KNEE   Diagnosis Additional Information: No value filed.    Anesthesia Type:  General, LMA    Note:  Anesthesia Post Evaluation    Patient location during evaluation: PACU  Patient participation: Able to fully participate in evaluation  Level of consciousness: awake and alert  Pain management: adequate  Airway patency: patent  Cardiovascular status: acceptable  Respiratory status: acceptable  Hydration status: acceptable  PONV: none     Anesthetic complications: None          Last vitals:  Vitals:    12/05/17 1500 12/05/17 1510 12/05/17 1520   BP: (!) 141/92 (!) 142/100 145/85   Resp: 13 17 10   Temp:      SpO2: 98% 98% 99%         Electronically Signed By: Kody Pineda MD  December 5, 2017  3:46 PM

## 2017-12-05 NOTE — OP NOTE
DATE OF PROCEDURE:  12/05/2017       PREOPERATIVE DIAGNOSIS:  Degenerative arthrosis, right knee.      POSTOPERATIVE DIAGNOSIS:  Degenerative arthrosis, right knee.      PROCEDURE:  Right total knee arthroplasty.      COMPLICATIONS, ASSOCIATED OVERALL COMORBIDITIES:  None apparent.      ANESTHESIA:  Spinal with femoral block augmentation.      BLOOD LOSS:  Less than 10 mL.      TOURNIQUET TIME:  65 minutes.      COMPONENTS UTILIZED:  [employer] LPS component, a size 7 femur, 6 baseplate, a 15 poly spacer and a 35 poly button.      INDICATION:  Gasper Chacon is a 57-year-old gentleman with severe end-stage tricompartment disease with a fixed flexion contracture of 20 degrees and fixed varus deformity of 20 degrees.  He has marked diminution in ADL function, failed conservative methods of management, considered a candidate now for arthroplasty.  Benefits and risks were discussed, and consent for the procedure obtained.      DESCRIPTION OF PROCEDURE:  The patient was taken to the operating room with documentation of a 20 degree flexion contracture, 20 degree fixed varus deformity.  Routine prepping and draping, elevation, exsanguination.  Tourniquet inflated then to 300 mmHg pressure.  Straight line incision was made down through the soft tissues.  Complete medial release performed and through an intramedullary approach, distal femoral cut was done with a 10 mm resection off the lateral femoral condyle, which documented anterior, posterior and chamfer cuts done for size 7 femur with preparation of intercondylar notch to use an LPS construct.  On the tibia after complete medial release had been documented and removal of osteophytes, a 6 baseplate was positioned after routine osteotomy with a 15 mm resection done off the lateral tibial plateau with essentially 0 resection off the medial side.  With the 15 spacer, the flexion contracture was completely eliminated and the varus deformity had alignment appropriate  physiologic.  The patellar was prepared with sharp oscillating saw for 35 poly button.  After preparation of all bony surfaces under pressurization technique, the tibia, femur and patella were cemented, spacer placed.  All excess cement was removed after curing.  Again, we had documentation of normal tracking.  Closure was done with interrupted #2 Ethibond in the deep fascia, 2-0 Vicryl in subcutaneous tissues, staples within the skin.  Placement in a routine compressive dressing, leaving the operating facility in satisfactory condition overall with no unusual apparent complication.         SANA FRANCIS MD             D: 2017 13:39   T: 2017 16:39   MT: [id number]      Name:     SHREYAS GARVEY   MRN:      [id number]        Account:        [id number]   :      1960           Procedure Date: 2017      Document: [id number]

## 2017-12-05 NOTE — BRIEF OP NOTE
[facility] Brief Operative Note    Pre-operative diagnosis: DJD RIGHT KNEE    Post-operative diagnosis same   Procedure: Procedure(s):  RIGHT TOTAL KNEE ARTHROPLASTY  - Wound Class: I-Clean   Surgeon(s): Surgeon(s) and Role:     * Luis Enrique Begum MD - Primary     * Homa Hall PA-C - Assisting   Estimated blood loss: 5 mL    Specimens: * No specimens in log *   Findings: arthritis

## 2017-12-05 NOTE — ANESTHESIA PROCEDURE NOTES
Peripheral nerve/Neuraxial procedure note : Adductor canal  Pre-Procedure  Performed by ANGELLA OROZCO  Location: pre-op      Pre-Anesthestic Checklist: patient identified, IV checked, site marked, risks and benefits discussed, informed consent, monitors and equipment checked, at physician/surgeon's request and post-op pain management    Timeout  Correct Patient: Yes   Correct Procedure: Yes   Correct Site: Yes   Correct Laterality: Yes   Correct Position: Yes   Site Marked: Yes   .   Procedure Documentation    .    Procedure:    Adductor canal.  Local skin infiltrated with 3 mL of 1% lidocaine.     Ultrasound used to identify targeted nerve, plexus, or vascular marker and placed a needle adjacent to it., Ultrasound was used to visualize the spread of the anesthetic in close proximity to the above stated nerve. A permanent image is entered into the patient's record.  Patient Prep;mask, sterile gloves, chlorhexidine gluconate and isopropyl alcohol, patient draped.  Nerve Stim: Initial Level 1 mA. Lowest motor response mA..  Needle: insulated, short bevel Needle Gauge: 20.    Needle Length (Inches) 2  .       Assessment/Narrative  Paresthesias: No.  .  The placement was negative for: blood aspirated, painful injection and site bleeding.  Bolus given via needle..   Secured via.   Complications: none. Comments:  Single shot femoral nerve block  35 ml 0.5% Ropivacaine with 1:400,000 Epinephrine

## 2017-12-06 ENCOUNTER — APPOINTMENT (OUTPATIENT)
Dept: PHYSICAL THERAPY | Facility: CLINIC | Age: 57
DRG: 470 | End: 2017-12-06
Attending: ORTHOPAEDIC SURGERY
Payer: COMMERCIAL

## 2017-12-06 ENCOUNTER — APPOINTMENT (OUTPATIENT)
Dept: OCCUPATIONAL THERAPY | Facility: CLINIC | Age: 57
DRG: 470 | End: 2017-12-06
Attending: ORTHOPAEDIC SURGERY
Payer: COMMERCIAL

## 2017-12-06 LAB
GLUCOSE SERPL-MCNC: 110 MG/DL (ref 70–99)
HGB BLD-MCNC: 11.8 G/DL (ref 13.3–17.7)

## 2017-12-06 PROCEDURE — 85018 HEMOGLOBIN: CPT | Performed by: ORTHOPAEDIC SURGERY

## 2017-12-06 PROCEDURE — 25000125 ZZHC RX 250: Performed by: ORTHOPAEDIC SURGERY

## 2017-12-06 PROCEDURE — 36415 COLL VENOUS BLD VENIPUNCTURE: CPT | Performed by: ORTHOPAEDIC SURGERY

## 2017-12-06 PROCEDURE — 12000007 ZZH R&B INTERMEDIATE

## 2017-12-06 PROCEDURE — 25000132 ZZH RX MED GY IP 250 OP 250 PS 637: Performed by: ORTHOPAEDIC SURGERY

## 2017-12-06 PROCEDURE — 97110 THERAPEUTIC EXERCISES: CPT | Mod: GP | Performed by: PHYSICAL THERAPY ASSISTANT

## 2017-12-06 PROCEDURE — 25000128 H RX IP 250 OP 636: Performed by: ORTHOPAEDIC SURGERY

## 2017-12-06 PROCEDURE — 97535 SELF CARE MNGMENT TRAINING: CPT | Mod: GO | Performed by: OCCUPATIONAL THERAPIST

## 2017-12-06 PROCEDURE — 40000193 ZZH STATISTIC PT WARD VISIT: Performed by: PHYSICAL THERAPY ASSISTANT

## 2017-12-06 PROCEDURE — 97116 GAIT TRAINING THERAPY: CPT | Mod: GP | Performed by: PHYSICAL THERAPY ASSISTANT

## 2017-12-06 PROCEDURE — 40000193 ZZH STATISTIC PT WARD VISIT: Performed by: PHYSICAL THERAPIST

## 2017-12-06 PROCEDURE — 97110 THERAPEUTIC EXERCISES: CPT | Mod: GP | Performed by: PHYSICAL THERAPIST

## 2017-12-06 PROCEDURE — 40000133 ZZH STATISTIC OT WARD VISIT: Performed by: OCCUPATIONAL THERAPIST

## 2017-12-06 PROCEDURE — 97116 GAIT TRAINING THERAPY: CPT | Mod: GP | Performed by: PHYSICAL THERAPIST

## 2017-12-06 PROCEDURE — 97161 PT EVAL LOW COMPLEX 20 MIN: CPT | Mod: GP | Performed by: PHYSICAL THERAPIST

## 2017-12-06 PROCEDURE — 97530 THERAPEUTIC ACTIVITIES: CPT | Mod: GP | Performed by: PHYSICAL THERAPIST

## 2017-12-06 PROCEDURE — 82947 ASSAY GLUCOSE BLOOD QUANT: CPT | Performed by: ORTHOPAEDIC SURGERY

## 2017-12-06 RX ADMIN — OXYCODONE HYDROCHLORIDE 5 MG: 5 TABLET ORAL at 21:32

## 2017-12-06 RX ADMIN — ACETAMINOPHEN 975 MG: 325 TABLET, FILM COATED ORAL at 06:07

## 2017-12-06 RX ADMIN — OXYCODONE HYDROCHLORIDE 10 MG: 5 TABLET ORAL at 03:59

## 2017-12-06 RX ADMIN — ACETAMINOPHEN 975 MG: 325 TABLET, FILM COATED ORAL at 13:49

## 2017-12-06 RX ADMIN — OXYCODONE HYDROCHLORIDE 10 MG: 5 TABLET ORAL at 00:35

## 2017-12-06 RX ADMIN — OXYCODONE HYDROCHLORIDE 10 MG: 10 TABLET, FILM COATED, EXTENDED RELEASE ORAL at 17:54

## 2017-12-06 RX ADMIN — OXYCODONE HYDROCHLORIDE 10 MG: 10 TABLET, FILM COATED, EXTENDED RELEASE ORAL at 06:08

## 2017-12-06 RX ADMIN — ACETAMINOPHEN 975 MG: 325 TABLET, FILM COATED ORAL at 21:32

## 2017-12-06 RX ADMIN — KETOROLAC TROMETHAMINE 30 MG: 30 INJECTION, SOLUTION INTRAMUSCULAR at 08:09

## 2017-12-06 RX ADMIN — KETOROLAC TROMETHAMINE 30 MG: 30 INJECTION, SOLUTION INTRAMUSCULAR at 01:36

## 2017-12-06 RX ADMIN — ASPIRIN 325 MG ORAL TABLET 325 MG: 325 PILL ORAL at 21:32

## 2017-12-06 RX ADMIN — ASPIRIN 325 MG ORAL TABLET 325 MG: 325 PILL ORAL at 08:09

## 2017-12-06 RX ADMIN — ATORVASTATIN CALCIUM 10 MG: 10 TABLET, FILM COATED ORAL at 08:09

## 2017-12-06 RX ADMIN — SENNOSIDES AND DOCUSATE SODIUM 2 TABLET: 8.6; 5 TABLET ORAL at 21:32

## 2017-12-06 RX ADMIN — DIAZEPAM 5 MG: 5 TABLET ORAL at 01:37

## 2017-12-06 RX ADMIN — SENNOSIDES AND DOCUSATE SODIUM 1 TABLET: 8.6; 5 TABLET ORAL at 08:10

## 2017-12-06 RX ADMIN — OXYCODONE HYDROCHLORIDE 10 MG: 5 TABLET ORAL at 13:49

## 2017-12-06 RX ADMIN — CHLORTHALIDONE 25 MG: 25 TABLET ORAL at 08:09

## 2017-12-06 RX ADMIN — CEFAZOLIN SODIUM 2 G: 2 INJECTION, SOLUTION INTRAVENOUS at 03:56

## 2017-12-06 RX ADMIN — KETOROLAC TROMETHAMINE 30 MG: 30 INJECTION, SOLUTION INTRAMUSCULAR at 13:49

## 2017-12-06 RX ADMIN — OXYCODONE HYDROCHLORIDE 10 MG: 5 TABLET ORAL at 08:09

## 2017-12-06 RX ADMIN — ONDANSETRON 4 MG: 4 TABLET, ORALLY DISINTEGRATING ORAL at 15:43

## 2017-12-06 NOTE — PROGRESS NOTES
TARA    D: Received a voicemail from nurse MARIBELL Meraz through [employer]. She is available for discharge planning needs if needed. [phone]    P: Continue to follow.

## 2017-12-06 NOTE — PLAN OF CARE
Problem: Patient Care Overview  Goal: Plan of Care/Patient Progress Review  Outcome: Improving  Pt up with SB, voiding, oxycodone for pain.  DC home tomorrow.

## 2017-12-06 NOTE — PROGRESS NOTES
12/06/17 1023   Quick Adds   Type of Visit Initial Occupational Therapy Evaluation   Living Environment   Lives With spouse   Living Arrangements house  ([facility])   Home Accessibility bed not on first floor;stairs within home   Number of Stairs Within Home 12  (1 flight up to bath and bedroom)   Stair Railings at Home inside, present at both sides   Transportation Available car;family or friend will provide   Living Environment Comment pt has underground parking and an elevator   Self-Care   Dominant Hand right   Usual Activity Tolerance good   Current Activity Tolerance fair   Regular Exercise yes   Activity/Exercise Type strength training;walking  (eliptical)   Exercise Amount/Frequency 1 hr;3-5 times/wk   Equipment Currently Used at Home none   Activity/Exercise/Self-Care Comment pt works full time as a  at [employer]   Functional Level Prior   Ambulation 0-->independent   Transferring 0-->independent   Toileting 0-->independent   Bathing 0-->independent   Dressing 0-->independent   Eating 0-->independent   Communication 0-->understands/communicates without difficulty   Swallowing 0-->swallows foods/liquids without difficulty   Cognition 0 - no cognition issues reported   Fall history within last six months no   Which of the above functional risks had a recent onset or change? ambulation;transferring;bathing;dressing   General Information   Onset of Illness/Injury or Date of Surgery - Date 12/05/17   Referring Physician Luis Enrique Begum   Patient/Family Goals Statement return home and to work. Can be off work 7 weeks   Additional Occupational Profile Info/Pertinent History of Current Problem Pt admitted for right TKA, had tricompartment disease with fixed flexion at 20 degrees and a fixed vargus deformity of 20 degrees   Precautions/Limitations fall precautions  (currently wearing KI)   General Observations pt sleepy but participating, supine in bed   Cognitive Status Examination   Orientation orientation  to person, place and time   Level of Consciousness alert   Able to Follow Commands WNL/WFL   Personal Safety (Cognitive) WNL/WFL   Memory intact   Attention No deficits were identified   Visual Perception   Visual Perception No deficits were identified   Pain Assessment   Patient Currently in Pain Yes, see Vital Sign flowsheet   Range of Motion (ROM)   ROM Quick Adds No deficits were identified   ROM Comment B UEs   Strength   Manual Muscle Testing Quick Adds No deficits were identified   Strength Comments B UEs   Coordination   Upper Extremity Coordination No deficits were identified   Mobility   Bed Mobility Bed mobility skill: Sit to supine;Bed mobility skill: Supine to sit   Bed Mobility Skill: Sit to Supine   Level of [place]: Sit/Supine moderate assist (50% patients effort)   Physical Assist/Nonphysical Assist: Sit/Supine verbal cues;1 person assist   Bed Mobility Skill: Supine to Sit   Level of [place]: Supine/Sit moderate assist (50% patients effort)   Physical Assist/Nonphysical Assist: Supine/Sit verbal cues;1 person assist   Lower Body Dressing   Level of [place]: Dress Lower Body stand-by assist   Physical Assist/Nonphysical Assist: Dress Lower Body verbal cues;1 person assist   Assistive Device (talked about reacher and sockaide - should not need if no KI)   Activities of Daily Living Analysis   Impairments Contributing to Impaired Activities of Daily Living pain;ROM decreased;strength decreased   General Therapy Interventions   Planned Therapy Interventions ADL retraining;transfer training;home program guidelines   Clinical Impression   Criteria for Skilled Therapeutic Interventions Met yes, treatment indicated   OT Diagnosis decreased independence in ADLS   Influenced by the following impairments pain, decreased ROM, decreased endurance   Assessment of Occupational Performance 3-5 Performance Deficits   Identified Performance Deficits decreased endurance for dressing, bathing, household  "chores, work tasks   Clinical Decision Making (Complexity) Low complexity   Therapy Frequency daily   Predicted Duration of Therapy Intervention (days/wks) 3 days   Anticipated Discharge Disposition Home with Assist   Risks and Benefits of Treatment have been explained. Yes   Patient, Family & other staff in agreement with plan of care Yes   [facility] TM \"6 Clicks\"   2016, Trustees of [facility], under license to [employer].  All rights reserved.   6 Clicks Short Forms Daily Activity Inpatient Short Form   [facility]  \"6 Clicks\" Daily Activity Inpatient Short Form   1. Putting on and taking off regular lower body clothing? 3 - A Little   2. Bathing (including washing, rinsing, drying)? 3 - A Little   3. Toileting, which includes using toilet, bedpan or urinal? 3 - A Little   4. Putting on and taking off regular upper body clothing? 4 - None   5. Taking care of personal grooming such as brushing teeth? 4 - None   6. Eating meals? 4 - None   Daily Activity Raw Score (Score out of 24.Lower scores equate to lower levels of function) 21   Total Evaluation Time   Total Evaluation Time (Minutes) 15     "

## 2017-12-06 NOTE — PLAN OF CARE
Problem: Knee Arthroplasty (Total, Partial) (Adult)  Goal: Signs and Symptoms of Listed Potential Problems Will be Absent, Minimized or Managed (Knee Arthroplasty)  Signs and symptoms of listed potential problems will be absent, minimized or managed by discharge/transition of care (reference Knee Arthroplasty (Total, Partial) (Adult) CPG).   Outcome: No Change  Pt's CMS good to feet.  Knee drsg dry and intact.  Unable to void even after sitting on edge of bed.  St cathed for 700cc.  Rating pain #5 with improvement after Dilaudid and Oxycodone. Denies nausea.

## 2017-12-06 NOTE — PLAN OF CARE
Problem: Patient Care Overview  Goal: Plan of Care/Patient Progress Review  Outcome: Improving  Pt is A&Ox4, CMS intact. Assist x1/GB/walker. Voiding in urinal. Pain well managed w/ oxycodone. Progressing per plan of care.

## 2017-12-06 NOTE — PLAN OF CARE
Problem: Patient Care Overview  Goal: Plan of Care/Patient Progress Review  PT eval completed and treatment initiated,  Pt admitted for R TKA, WBAT.   Pt's previous level of function was I at home with spouse with previous ROM limitation with 20 flexion contracture and 20 degrees varus deformity.    Discharge Planner PT   Patient plan for discharge: home wth OP PT  Current status: SBA for bed mob, transfers with FWW and amb with ' with pain 3-5/10, AAROM 10-50 degrees, sats on %, renny LE ex, min A for SLR's  Barriers to return to prior living situation: stairs, yet to be assessed  Recommendations for discharge: TBD POD # 2  Rationale for recommendations: TBD POD #2       Entered by: WADE MERCEDES 12/06/2017 12:07 PM

## 2017-12-06 NOTE — PLAN OF CARE
Problem: Patient Care Overview  Goal: Plan of Care/Patient Progress Review  Outcome: Therapy, progress towards functional goals is fair  OT: Pt is a 57 year old male admitted Pt admitted for right TKA, had tricompartment disease with fixed flexion at 20 degrees and a fixed vargus deformity of 20 degrees.  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  Lives with spouse in a two level townhouse, has elevator from garage to his unit.  Full flight of stairs to bedroom and main bath.     Discharge Planner OT   Patient plan for discharge: Home with assist from spouse.  Current status: Initial evaluation complete, treatment started.  Pt supine in bed, willing to participate.  Mod A to support leg to come to EOB.  Pt currently wearing KI.  Tried LE dressing.  Able to don and doff socks bringing right leg partially back up to EOB, effort was tiring.  Demonstrated reacher and sock aide and talked about techniques to save energy while dressing.  Also provided EC handout.  Pt states he can sleep in a recliner to minimize stair climbing.  May want to get a shower chair for home.  Also talked to patient about walker precautions as walker was in room.    Barriers to return to prior living situation: Currently wearing KI, stairs up to bedroom.  Recommendations for discharge: Return home with assist and AE as needed.  Rationale for recommendations: Pt was active and in good overall shape.  Anticipate he will progress enough to return home with assistance from spouse and AE.       Entered by: Crescencio East 12/06/2017 10:42 AM

## 2017-12-06 NOTE — PROGRESS NOTES
POD#1  Excellent, pain well managed, CMS nl, Hgb11.8.  Active SLR.  Plan per pathway, home tomorow

## 2017-12-07 ENCOUNTER — APPOINTMENT (OUTPATIENT)
Dept: PHYSICAL THERAPY | Facility: CLINIC | Age: 57
DRG: 470 | End: 2017-12-07
Attending: ORTHOPAEDIC SURGERY
Payer: COMMERCIAL

## 2017-12-07 VITALS
HEART RATE: 74 BPM | RESPIRATION RATE: 16 BRPM | OXYGEN SATURATION: 98 % | SYSTOLIC BLOOD PRESSURE: 123 MMHG | DIASTOLIC BLOOD PRESSURE: 72 MMHG | WEIGHT: 197.09 LBS | HEIGHT: 69 IN | BODY MASS INDEX: 29.19 KG/M2 | TEMPERATURE: 98.8 F

## 2017-12-07 LAB — HGB BLD-MCNC: 10.8 G/DL (ref 13.3–17.7)

## 2017-12-07 PROCEDURE — 25000132 ZZH RX MED GY IP 250 OP 250 PS 637: Performed by: ORTHOPAEDIC SURGERY

## 2017-12-07 PROCEDURE — 97110 THERAPEUTIC EXERCISES: CPT | Mod: GP

## 2017-12-07 PROCEDURE — 97116 GAIT TRAINING THERAPY: CPT | Mod: GP

## 2017-12-07 PROCEDURE — 36415 COLL VENOUS BLD VENIPUNCTURE: CPT | Performed by: ORTHOPAEDIC SURGERY

## 2017-12-07 PROCEDURE — 85018 HEMOGLOBIN: CPT | Performed by: ORTHOPAEDIC SURGERY

## 2017-12-07 PROCEDURE — 40000193 ZZH STATISTIC PT WARD VISIT

## 2017-12-07 RX ORDER — KETOROLAC TROMETHAMINE 10 MG/1
10 TABLET, FILM COATED ORAL EVERY 6 HOURS PRN
Qty: 20 TABLET | Refills: 0 | Status: SHIPPED | OUTPATIENT
Start: 2017-12-07 | End: 2017-12-07

## 2017-12-07 RX ORDER — ACETAMINOPHEN 325 MG/1
975 TABLET ORAL EVERY 8 HOURS
Qty: 100 TABLET | Refills: 0 | Status: SHIPPED | OUTPATIENT
Start: 2017-12-07 | End: 2017-12-07

## 2017-12-07 RX ORDER — KETOROLAC TROMETHAMINE 10 MG/1
10 TABLET, FILM COATED ORAL EVERY 6 HOURS PRN
Qty: 20 TABLET | Refills: 0 | Status: SHIPPED | OUTPATIENT
Start: 2017-12-07

## 2017-12-07 RX ORDER — ACETAMINOPHEN 325 MG/1
975 TABLET ORAL EVERY 8 HOURS
Qty: 100 TABLET | Refills: 0 | Status: SHIPPED | OUTPATIENT
Start: 2017-12-07

## 2017-12-07 RX ORDER — ASPIRIN 325 MG
325 TABLET ORAL 2 TIMES DAILY
Qty: 20 TABLET | Refills: 0 | Status: SHIPPED | OUTPATIENT
Start: 2017-12-07 | End: 2017-12-07

## 2017-12-07 RX ORDER — ASPIRIN 325 MG
325 TABLET ORAL 2 TIMES DAILY
Qty: 20 TABLET | Refills: 0 | Status: SHIPPED | OUTPATIENT
Start: 2017-12-07

## 2017-12-07 RX ORDER — OXYCODONE HYDROCHLORIDE 5 MG/1
5-10 TABLET ORAL
Qty: 40 TABLET | Refills: 0 | Status: SHIPPED | OUTPATIENT
Start: 2017-12-07

## 2017-12-07 RX ORDER — AMOXICILLIN 250 MG
1-2 CAPSULE ORAL 2 TIMES DAILY
Qty: 20 TABLET | Refills: 0 | Status: SHIPPED | OUTPATIENT
Start: 2017-12-07

## 2017-12-07 RX ORDER — AMOXICILLIN 250 MG
1-2 CAPSULE ORAL 2 TIMES DAILY
Qty: 20 TABLET | Refills: 0 | Status: SHIPPED | OUTPATIENT
Start: 2017-12-07 | End: 2017-12-07

## 2017-12-07 RX ORDER — DIAZEPAM 5 MG
5 TABLET ORAL EVERY 6 HOURS PRN
Qty: 30 TABLET | Refills: 0 | Status: SHIPPED | OUTPATIENT
Start: 2017-12-07

## 2017-12-07 RX ADMIN — DIAZEPAM 5 MG: 5 TABLET ORAL at 12:23

## 2017-12-07 RX ADMIN — SENNOSIDES AND DOCUSATE SODIUM 2 TABLET: 8.6; 5 TABLET ORAL at 08:30

## 2017-12-07 RX ADMIN — OXYCODONE HYDROCHLORIDE 10 MG: 5 TABLET ORAL at 06:39

## 2017-12-07 RX ADMIN — OXYCODONE HYDROCHLORIDE 10 MG: 5 TABLET ORAL at 15:34

## 2017-12-07 RX ADMIN — OXYCODONE HYDROCHLORIDE 10 MG: 5 TABLET ORAL at 12:23

## 2017-12-07 RX ADMIN — OXYCODONE HYDROCHLORIDE 10 MG: 10 TABLET, FILM COATED, EXTENDED RELEASE ORAL at 05:20

## 2017-12-07 RX ADMIN — ACETAMINOPHEN 975 MG: 325 TABLET, FILM COATED ORAL at 05:20

## 2017-12-07 RX ADMIN — OXYCODONE HYDROCHLORIDE 10 MG: 5 TABLET ORAL at 09:45

## 2017-12-07 RX ADMIN — ACETAMINOPHEN 975 MG: 325 TABLET, FILM COATED ORAL at 14:19

## 2017-12-07 RX ADMIN — ASPIRIN 325 MG ORAL TABLET 325 MG: 325 PILL ORAL at 08:30

## 2017-12-07 RX ADMIN — CHLORTHALIDONE 25 MG: 25 TABLET ORAL at 08:30

## 2017-12-07 RX ADMIN — ATORVASTATIN CALCIUM 10 MG: 10 TABLET, FILM COATED ORAL at 08:30

## 2017-12-07 RX ADMIN — DIAZEPAM 5 MG: 5 TABLET ORAL at 02:13

## 2017-12-07 RX ADMIN — OXYCODONE HYDROCHLORIDE 5 MG: 5 TABLET ORAL at 02:13

## 2017-12-07 NOTE — DISCHARGE INSTRUCTIONS
"TOTAL KNEE REPLACEMENT TAKE HOME INSTRUCTIONS  Your surgeon will answer any questions about your progress. General guidelines for your care are listed below. Your surgeon may give additional instructions for your care at home. Please follow them carefully    Activity Level  1. Physical activity may be resumed gradually according to your comfort level and your surgeon s instructions. Follow your exercise program as instructed by your therapist. Do exercises at least twice a day. you may ice your knee after exercising.  2. Complete exercises two hours before bedtime to minimize the effect pain may have on sleep.  Refer to pages 19-22 of you \"Total Knee Replacement\" booklet for details  3. Do not wear your knee immobilizer unless your doctor has specifically told you to continue it.    Good Health Practices  1. Maintain an adequate fluid intake and eat a well balanced diet.  2. Be sure to include the basic food groups, such as dairy products, meat/fish, vegetables, and fruit. Each of these foods contribute to your wound healing and increasing your strength.  3. Surgery, decreased activity and pain medication all contribute to a decrease in bowel activity that can result in constipation. It is recommended that you increase your liquid intake, add fiber to your diet, increase activity, and decrease pain medication use. If you have any problems, notify your physician.  4. Wear your anti-embolism stockings day and night until seen by your surgeon if you were issued these at discharge.  (Not all patients will have anti-embolism stockings) Remove twice a day for one hour at a time. You may hand wash and air dry your stockings.  5. Notify your dentist of your total knee surgery and call your dentist one week before a dental appointment for antibiotics, if your dentist will not prescribe antibiotics then call your surgeon to ask for next steps.      Incision/Dressing Care  1. Keep incision clean and dry per surgeons " instructions  2. Cover incision if you are still having drainage.  3.  If you have a waterproof dressing __________________________   Shower.    Things to Watch For  1. Check incision daily for increased redness, tenderness, swelling, or drainage along the incision line. If these occur, please notify your doctor. Also, call if you develop a fever above 101 .  2. Please notify your doctor if you experience any calf pain and/or if you have surgical pain not relieved by the pain medication prescribed by your doctor.

## 2017-12-07 NOTE — DISCHARGE SUMMARY
ADMISSION DIAGNOSIS:  Degenerative arthrosis, right knee.      DISCHARGE DIAGNOSIS:  Degenerative arthrosis, right knee.      PROCEDURE THIS ADMISSION:  Right total knee arthroplasty.      DATE OF ADMISSION: 2017      DATE OF DISCHARGE: 2017       COMPLICATIONS, ASSOCIATED OVERALL COMORBIDITIES:  None apparent.      HISTORY AND HOSPITAL COURSE:  Gasper Garvey is a 57-year-old gentleman with end-stage tricompartment disease of his right knee.  He was taken to the operating room the same day as admission and underwent an uneventful right knee arthroplasty.  He is currently afebrile.  Wounds are benign, active straight leg raising, CMS status is normal.  Perioperative hemoglobin is 13.7.  He will be discharged to home on oxycodone, Tylenol, Toradol, and Valium to be taken on a p.r.n. basis.  A single Ascriptin given for anticoagulation prophylaxis b.i.d.  Outpatient therapy, return to clinic, home CPM have been provided.         SANA FRANCIS MD             D: 2017 08:57   T: 2017 09:18   MT: [id number]      Name:     GASPER GARVEY   MRN:      [id number]        Account:        [id number]   :      1960           Admit Date:     [id number]                                  Discharge Date:       Document: [id number]

## 2017-12-07 NOTE — PLAN OF CARE
Problem: Patient Care Overview  Goal: Plan of Care/Patient Progress Review  Outcome: Adequate for Discharge Date Met: 12/07/17  Pt A&Ox4, VSS, CMS intact, up with SBA and walker to BR, voiding well, tolerating regular diet, oxycodone/valium/tylenol for pain management, dressing changed, incision C,D,I with staples. Pt discharged to home with spouse. Pt verbalizes understanding of discharge instructions/medications/follow up plan. Pt spouse present during education.

## 2017-12-07 NOTE — PLAN OF CARE
Problem: Patient Care Overview  Goal: Plan of Care/Patient Progress Review  Outcome: Improving  Pt A&Ox4, VSS, CMS intact, dressing C,D,I, up with assist of one to BR, voiding well, oxycodone/oxycontin for pain management, IV SL, tolerating regular diet.

## 2017-12-07 NOTE — PLAN OF CARE
Problem: Patient Care Overview  Goal: Plan of Care/Patient Progress Review  Patient plan for discharge: home wth OP PT  Current status: Sit to/from stand with FWW and SBA. Sit to/from supine with CGA/min assist for R LE. Amb 150 ft x 1 with FWW and SBA; no KI donned and no knee buckling noted. Up/down 10 steps with B UEs on single R rail with SBA. Tolerates TKA exs moderately due to increased pain. Pt returned supine at end of session with all needs in reach and wife present.  Barriers to return to prior living situation: none noted at this time  Recommendations for discharge: home with OPPT per the plan established by the Physical Therapist   Rationale for recommendations: OPPT to progress strengthening, ROM and gait mechanics    Pt discharged to home with OPPT today.    PT goals partially met.

## 2017-12-07 NOTE — PLAN OF CARE
Problem: Patient Care Overview  Goal: Plan of Care/Patient Progress Review  Outcome: Improving  Patient is A&O, VSS on RA, CMS intact, dressing CDI, up with assist of 1 to BR, regular diet. Voiding adequately, oxycodone for pain control, and IV SL. Will continue to mointor

## 2017-12-07 NOTE — PLAN OF CARE
Problem: Patient Care Overview  Goal: Plan of Care/Patient Progress Review  Outcome: Improving  A/O x4. Up SBA with walker to BR. Voiding adequately. VSS on RA. CMS intact. Drsg C/D/I. Pain controlled with oxycodone. Progressing per POC. Will cont to monitor.

## 2017-12-08 ENCOUNTER — THERAPY VISIT (OUTPATIENT)
Dept: PHYSICAL THERAPY | Facility: CLINIC | Age: 57
End: 2017-12-08
Payer: COMMERCIAL

## 2017-12-08 DIAGNOSIS — Z96.651 PRESENCE OF RIGHT ARTIFICIAL KNEE JOINT: Primary | ICD-10-CM

## 2017-12-08 DIAGNOSIS — Z47.1 AFTERCARE FOLLOWING JOINT REPLACEMENT: ICD-10-CM

## 2017-12-08 PROCEDURE — 97010 HOT OR COLD PACKS THERAPY: CPT | Mod: GP | Performed by: PHYSICAL THERAPIST

## 2017-12-08 PROCEDURE — 97161 PT EVAL LOW COMPLEX 20 MIN: CPT | Mod: GP | Performed by: PHYSICAL THERAPIST

## 2017-12-08 PROCEDURE — 97110 THERAPEUTIC EXERCISES: CPT | Mod: GP | Performed by: PHYSICAL THERAPIST

## 2017-12-08 ASSESSMENT — ACTIVITIES OF DAILY LIVING (ADL)
GIVING WAY, BUCKLING OR SHIFTING OF KNEE: THE SYMPTOM PREVENTS ME FROM ALL DAILY ACTIVITIES
SIT WITH YOUR KNEE BENT: I AM UNABLE TO DO THE ACTIVITY
WEAKNESS: THE SYMPTOM PREVENTS ME FROM ALL DAILY ACTIVITIES
PAIN: THE SYMPTOM PREVENTS ME FROM ALL DAILY ACTIVITIES
KNEE_ACTIVITY_OF_DAILY_LIVING_SUM: 10
STIFFNESS: THE SYMPTOM PREVENTS ME FROM ALL DAILY ACTIVITIES
RAW_SCORE: 10
GO UP STAIRS: ACTIVITY IS VERY DIFFICULT
WALK: ACTIVITY IS VERY DIFFICULT
KNEEL ON THE FRONT OF YOUR KNEE: I AM UNABLE TO DO THE ACTIVITY
HOW_WOULD_YOU_RATE_THE_OVERALL_FUNCTION_OF_YOUR_KNEE_DURING_YOUR_USUAL_DAILY_ACTIVITIES?: SEVERELY ABNORMAL
STAND: ACTIVITY IS VERY DIFFICULT
HOW_WOULD_YOU_RATE_THE_CURRENT_FUNCTION_OF_YOUR_KNEE_DURING_YOUR_USUAL_DAILY_ACTIVITIES_ON_A_SCALE_FROM_0_TO_100_WITH_100_BEING_YOUR_LEVEL_OF_KNEE_FUNCTION_PRIOR_TO_YOUR_INJURY_AND_0_BEING_THE_INABILITY_TO_PERFORM_ANY_OF_YOUR_USUAL_DAILY_ACTIVITIES?: 20
GO DOWN STAIRS: ACTIVITY IS VERY DIFFICULT
SWELLING: THE SYMPTOM PREVENTS ME FROM ALL DAILY ACTIVITIES
AS_A_RESULT_OF_YOUR_KNEE_INJURY,_HOW_WOULD_YOU_RATE_YOUR_CURRENT_LEVEL_OF_DAILY_ACTIVITY?: SEVERELY ABNORMAL
RISE FROM A CHAIR: ACTIVITY IS VERY DIFFICULT
SQUAT: I AM UNABLE TO DO THE ACTIVITY
LIMPING: I DO NOT HAVE THE SYMPTOM
KNEE_ACTIVITY_OF_DAILY_LIVING_SCORE: 14.29

## 2017-12-08 NOTE — PROGRESS NOTES
Subjective:    Patient is a 57 year old male presenting with rehab right knee hpi.   Gasper Chacon is a 57 year old male with a right knee condition.  Condition occurred with:  Degenerative joint disease.    This is a chronic condition  Pt presents s/p R TKA 12-5-17. Pt reported discharge from hospital on 12-7-17 to home; pt reported he lives in a town-home with a flight of stairs to the second level; bedroom on second level.  Pt accompanied to PT by his spouse. Pt reported he was discharged home with instructions to use the CPM machine up to 3 hours/day and to use the immobilizer during the night and during sitting (in his recliner to avoid rolling the leg).    Patient reports pain:  Anterior, posterior, medial and lateral.    Pain is described as aching and is constant   Associated symptoms:  Loss of motion/stiffness, loss of strength and edema. Pain is worse during the day and worse during the night.  Symptoms are exacerbated by weight bearing, standing, walking, ascending stairs and descending stairs and relieved by rest, ice and analgesics.  Since onset symptoms are unchanged.        General health as reported by patient is good.                  Barriers include:  Stairs.    Red flags:  None as reported by the patient.                        Objective:      Gait:    Weight Bearing Status:  WBAT   Assistive Devices:  Walker and brace                                                        Knee Evaluation:  ROM:    AROM      Extension: Left:    Right:  2  Flexion: Left:   Right: 78        Strength:           Quad Set Right: Fair    Pain:        Edema:  Edema of the knee: generalized swelling.            General     ROS    Assessment/Plan:      Patient is a 57 year old male with right side knee complaints.    Patient has the following significant findings with corresponding treatment plan.                Diagnosis 1:  S/p R TKA  Pain -  hot/cold therapy, self management, education and home program  Decreased  ROM/flexibility - therapeutic exercise  Decreased strength - therapeutic exercise and therapeutic activities  Edema - cold therapy and cryocuff (home use)  Impaired gait - gait training  Impaired muscle performance - neuro re-education  Decreased function - therapeutic activities    Therapy Evaluation Codes:   1) History comprised of:   Personal factors that impact the plan of care:      None.    Comorbidity factors that impact the plan of care are:      None.     Medications impacting care: None.  2) Examination of Body Systems comprised of:   Body structures and functions that impact the plan of care:      Knee.   Activity limitations that impact the plan of care are:      Driving, Squatting/kneeling, Stairs, Standing, Walking and Sleeping.  3) Clinical presentation characteristics are:   Stable/Uncomplicated.  4) Decision-Making    Low complexity using standardized patient assessment instrument and/or measureable assessment of functional outcome.  Cumulative Therapy Evaluation is: Low complexity.    Previous and current functional limitations:  (See Goal Flow Sheet for this information)    Short term and Long term goals: (See Goal Flow Sheet for this information)     Communication ability:  Patient appears to be able to clearly communicate and understand verbal and written communication and follow directions correctly.  Treatment Explanation - The following has been discussed with the patient:   RX ordered/plan of care  Anticipated outcomes  Possible risks and side effects  This patient would benefit from PT intervention to resume normal activities.   Rehab potential is excellent.    Frequency:  2 X week, once daily  Duration:  for 12-14 visits  Discharge Plan:  Achieve all LTG.  Independent in home treatment program.  Reach maximal therapeutic benefit.    Please refer to the daily flowsheet for treatment today, total treatment time and time spent performing 1:1 timed codes.

## 2017-12-08 NOTE — MR AVS SNAPSHOT
After Visit Summary   12/8/2017    Gasepr Chacon    MRN: [id number]           Patient Information     Date Of Birth          1960        Visit Information        Provider Department      12/8/2017 1:20 PM Sudha Mack, PT [facility]        Today's Diagnoses     Presence of right artificial knee joint    -  1    Aftercare following joint replacement           Follow-ups after your visit        Your next 10 appointments already scheduled     Dec 11, 2017  1:20 PM CST   CARLOS Extremity with Sudha Mack PT   [facility] ([facility]  )    [address]  [facility] [address]   [phone]            Dec 14, 2017  4:50 PM CST   CARLOS Extremity with Francisco Daugherty PT   [facility] ([facility]  )    [address]  [facility] [id number] [phone]            Dec 18, 2017  3:20 PM CST   CARLOS Extremity with Sudha Mack PT   [facility] ([facility]  )    [address]  [facility] [address]   [phone]            Dec 21, 2017  3:20 PM CST   CARLOS Extremity with Sudha Mack PT   [facility] ([facility]  )    [address]  [facility] [address]   [phone]            Dec 27, 2017  1:20 PM CST   CARLOS Extremity with Sudha Mack PT   [facility] ([facility]  )    [address]  [facility] [address]   [phone]            Dec 29, 2017  1:20 PM CST   CARLOS Extremity with Sudha Mack PT   [facility] ([facility]  )    [address]  [facility] [address] [phone]            Jan 02, 2018   "3:20 PM CST   CARLOS Extremity with Sudha Mack, PT   [facility] ([facility]  )    [address] [facility] [address]  [facility] [id number] [phone]            2018  4:40 PM CST   CARLOS Extremity with Sudha Martina, PT   [facility] ([facility]  )    [address] [facility] [address]  [facility] [id number] [phone]              Who to contact     If you have questions or need follow up information about today's clinic visit or your schedule please contact [facility] directly at [phone].  Normal or non-critical lab and imaging results will be communicated to you by [employer], letter or phone within 4 business days after the clinic has received the results. If you do not hear from us within 7 days, please contact the clinic through MyChart or phone. If you have a critical or abnormal lab result, we will notify you by phone as soon as possible.  Submit refill requests through [employer] or call your pharmacy and they will forward the refill request to us. Please allow 3 business days for your refill to be completed.          Additional Information About Your Visit        [employer] Information     [employer] lets you send messages to your doctor, view your test results, renew your prescriptions, schedule appointments and more. To sign up, go to [email or web address] . Click on \"Log in\" on the left side of the screen, which will take you to the Welcome page. Then click on \"Sign up Now\" on the right side of the page.     You will be asked to enter the access code listed below, as well as some personal information. Please follow the directions to create your username and password.     Your access code is: [email or web address]  Expires: 3/7/2018  9:56 AM     Your access code will  in 90 days. If you need help or a new code, please call your [place] " clinic or [phone].        Care EveryWhere ID     This is your Care EveryWhere ID. This could be used by other organizations to access your [place] medical records  [id number]         Blood Pressure from Last 3 Encounters:   12/07/17 123/72    Weight from Last 3 Encounters:   12/05/17 89.4 kg (197 lb 1.5 oz)              We Performed the Following     Hot or Cold Packs Therapy     CARLOS Inital Eval Report     PT Eval, Low Complexity (14698)     Therapeutic Exercises        Primary Care Provider Office Phone # Fax #    Luiza Bender [phone]       [facility] [address] [address]        Equal Access to Services     CATRACHITA SOTO : Hadii brando plascencia hadasho [employer], waaxda luqadaha, qaybta kaalmada adeegyada, andre woods . So [facility] [phone].    ATENCIÓN: Si habla español, tiene a morales disposición servicios gratuitos de asistencia lingüística. Llame al [phone].    We comply with applicable federal civil rights laws and [place] laws. We do not discriminate on the basis of race, color, national origin, age, disability, sex, sexual orientation, or gender identity.            Thank you!     Thank you for choosing [facility]  for your care. Our goal is always to provide you with excellent care. Hearing back from our patients is one way we can continue to improve our services. Please take a few minutes to complete the written survey that you may receive in the mail after your visit with us. Thank you!             Your Updated Medication List - Protect others around you: Learn how to safely use, store and throw away your medicines at www.disposemymeds.org.          This list is accurate as of: 12/8/17  5:40 PM.  Always use your most recent med list.                   Brand Name Dispense Instructions for use Diagnosis    aspirin 325 MG tablet     20 tablet    Take 1 tablet (325 mg) by mouth 2 times  daily    History of arthroplasty of right knee       CHLORTHALIDONE PO      Take 25 mg by mouth every morning        diazepam 5 MG tablet    VALIUM    30 tablet    Take 1 tablet (5 mg) by mouth every 6 hours as needed for anxiety    History of arthroplasty of right knee       GREEN TEA EXTRACT PO      Take 2 capsules by mouth every morning        ketorolac 10 MG tablet    TORADOL    20 tablet    Take 1 tablet (10 mg) by mouth every 6 hours as needed    History of arthroplasty of right knee       LIPITOR PO      Take 10 mg by mouth every morning        NONFORMULARY      Take 8 oz by mouth every morning (Patient mixes 2 Tbls lemon jiuce,2 Tbls Apple Cider Vinegar, 1 Tbls Cinnamon & 1 Tbls honey mixed in 8 ounces of water daily)        order for DME     1 each    Equipment being ordered: Walker Wheels () and Walker () Treatment Diagnosis: difficulty walking    History of arthroplasty of right knee       oxyCODONE IR 5 MG tablet    ROXICODONE    40 tablet    Take 1-2 tablets (5-10 mg) by mouth every 3 hours as needed for moderate to severe pain    History of arthroplasty of right knee       prenatal multivitamin plus iron 27-0.8 MG Tabs per tablet      Take 1 tablet by mouth every morning        senna-docusate 8.6-50 MG per tablet    SENOKOT-S;PERICOLACE    20 tablet    Take 1-2 tablets by mouth 2 times daily    History of arthroplasty of right knee       * TYLENOL PO      Take 1,000 mg by mouth daily as needed for mild pain or fever        * acetaminophen 325 MG tablet    TYLENOL    100 tablet    Take 3 tablets (975 mg) by mouth every 8 hours    History of arthroplasty of right knee       VITAMIN D (CHOLECALCIFEROL) PO      Take 5,000 Units by mouth every morning        * Notice:  This list has 2 medication(s) that are the same as other medications prescribed for you. Read the directions carefully, and ask your doctor or other care provider to review them with you.

## 2017-12-11 NOTE — PROGRESS NOTES
Subjective:    Patient is a 57 year old male presenting with rehab left ankle/foot hpi.                                      Pertinent medical history includes:  High blood pressure, implanted device and other (pain at night/rest).    Other surgeries include:  Orthopedic surgery (Knee replacement).  Current medications:  Anti-inflammatory, high blood pressure medication and other (Oxycodone, cholesteral, toradol).  Current occupation is .    Primary job tasks include:  Prolonged standing, operating a machine and driving.                   Knee Activity of Daily Living Score: 14.29            Objective:    System    Physical Exam    General     ROS    Assessment/Plan:

## 2017-12-14 ENCOUNTER — THERAPY VISIT (OUTPATIENT)
Dept: PHYSICAL THERAPY | Facility: CLINIC | Age: 57
End: 2017-12-14
Payer: COMMERCIAL

## 2017-12-14 DIAGNOSIS — Z47.1 AFTERCARE FOLLOWING JOINT REPLACEMENT: ICD-10-CM

## 2017-12-14 DIAGNOSIS — Z96.651 PRESENCE OF RIGHT ARTIFICIAL KNEE JOINT: ICD-10-CM

## 2017-12-14 PROCEDURE — 97110 THERAPEUTIC EXERCISES: CPT | Mod: GP | Performed by: PHYSICAL THERAPIST

## 2017-12-14 PROCEDURE — 97010 HOT OR COLD PACKS THERAPY: CPT | Mod: GP | Performed by: PHYSICAL THERAPIST

## 2017-12-14 PROCEDURE — 97530 THERAPEUTIC ACTIVITIES: CPT | Mod: GP | Performed by: PHYSICAL THERAPIST

## 2017-12-18 ENCOUNTER — THERAPY VISIT (OUTPATIENT)
Dept: PHYSICAL THERAPY | Facility: CLINIC | Age: 57
End: 2017-12-18
Payer: COMMERCIAL

## 2017-12-18 DIAGNOSIS — Z47.1 AFTERCARE FOLLOWING RIGHT KNEE JOINT REPLACEMENT SURGERY: ICD-10-CM

## 2017-12-18 DIAGNOSIS — Z96.651 AFTERCARE FOLLOWING RIGHT KNEE JOINT REPLACEMENT SURGERY: ICD-10-CM

## 2017-12-18 DIAGNOSIS — Z96.651 PRESENCE OF RIGHT ARTIFICIAL KNEE JOINT: ICD-10-CM

## 2017-12-18 PROCEDURE — 97110 THERAPEUTIC EXERCISES: CPT | Mod: GP | Performed by: PHYSICAL THERAPIST

## 2017-12-18 PROCEDURE — 97010 HOT OR COLD PACKS THERAPY: CPT | Mod: GP | Performed by: PHYSICAL THERAPIST

## 2017-12-18 PROCEDURE — 97530 THERAPEUTIC ACTIVITIES: CPT | Mod: GP | Performed by: PHYSICAL THERAPIST

## 2017-12-20 ENCOUNTER — HOSPITAL ENCOUNTER (EMERGENCY)
Facility: CLINIC | Age: 57
Discharge: HOME | End: 2017-12-20
Attending: EMERGENCY MEDICINE | Admitting: EMERGENCY MEDICINE
Payer: COMMERCIAL

## 2017-12-20 ENCOUNTER — APPOINTMENT (OUTPATIENT)
Dept: GENERAL RADIOLOGY | Facility: CLINIC | Age: 57
End: 2017-12-20
Attending: EMERGENCY MEDICINE
Payer: COMMERCIAL

## 2017-12-20 VITALS
HEIGHT: 71 IN | WEIGHT: 197 LBS | TEMPERATURE: 101.5 F | OXYGEN SATURATION: 100 % | HEART RATE: 96 BPM | SYSTOLIC BLOOD PRESSURE: 134 MMHG | DIASTOLIC BLOOD PRESSURE: 68 MMHG | RESPIRATION RATE: 16 BRPM | BODY MASS INDEX: 27.58 KG/M2

## 2017-12-20 DIAGNOSIS — R05.9 COUGH: ICD-10-CM

## 2017-12-20 DIAGNOSIS — J10.1 INFLUENZA A: Primary | ICD-10-CM

## 2017-12-20 LAB
ALBUMIN SERPL-MCNC: 3.7 G/DL (ref 3.4–5)
ALP SERPL-CCNC: 67 U/L (ref 40–150)
ALT SERPL W P-5'-P-CCNC: 38 U/L (ref 0–70)
ANION GAP SERPL CALCULATED.3IONS-SCNC: 6 MMOL/L (ref 3–14)
AST SERPL W P-5'-P-CCNC: 19 U/L (ref 0–45)
BASOPHILS # BLD AUTO: 0 10E9/L (ref 0–0.2)
BASOPHILS NFR BLD AUTO: 0.1 %
BILIRUB SERPL-MCNC: 0.5 MG/DL (ref 0.2–1.3)
BUN SERPL-MCNC: 19 MG/DL (ref 7–30)
CALCIUM SERPL-MCNC: 8.8 MG/DL (ref 8.5–10.1)
CHLORIDE SERPL-SCNC: 97 MMOL/L (ref 94–109)
CO2 SERPL-SCNC: 31 MMOL/L (ref 20–32)
CREAT SERPL-MCNC: 1.18 MG/DL (ref 0.66–1.25)
DIFFERENTIAL METHOD BLD: ABNORMAL
EOSINOPHIL # BLD AUTO: 0 10E9/L (ref 0–0.7)
EOSINOPHIL NFR BLD AUTO: 0.1 %
ERYTHROCYTE [DISTWIDTH] IN BLOOD BY AUTOMATED COUNT: 12.9 % (ref 10–15)
FLUAV+FLUBV AG SPEC QL: NEGATIVE
FLUAV+FLUBV AG SPEC QL: POSITIVE
GFR SERPL CREATININE-BSD FRML MDRD: 64 ML/MIN/1.7M2
GLUCOSE SERPL-MCNC: 100 MG/DL (ref 70–99)
HCT VFR BLD AUTO: 34.9 % (ref 40–53)
HGB BLD-MCNC: 11.2 G/DL (ref 13.3–17.7)
IMM GRANULOCYTES # BLD: 0 10E9/L (ref 0–0.4)
IMM GRANULOCYTES NFR BLD: 0.4 %
INTERPRETATION ECG - MUSE: NORMAL
LACTATE SERPL-SCNC: 1.2 MMOL/L (ref 0.4–2)
LYMPHOCYTES # BLD AUTO: 0.6 10E9/L (ref 0.8–5.3)
LYMPHOCYTES NFR BLD AUTO: 8.6 %
MCH RBC QN AUTO: 28.3 PG (ref 26.5–33)
MCHC RBC AUTO-ENTMCNC: 32.1 G/DL (ref 31.5–36.5)
MCV RBC AUTO: 88 FL (ref 78–100)
MONOCYTES # BLD AUTO: 0.9 10E9/L (ref 0–1.3)
MONOCYTES NFR BLD AUTO: 13 %
NEUTROPHILS # BLD AUTO: 5.5 10E9/L (ref 1.6–8.3)
NEUTROPHILS NFR BLD AUTO: 77.8 %
NRBC # BLD AUTO: 0 10*3/UL
NRBC BLD AUTO-RTO: 0 /100
PLATELET # BLD AUTO: 354 10E9/L (ref 150–450)
POTASSIUM SERPL-SCNC: 3.5 MMOL/L (ref 3.4–5.3)
PROT SERPL-MCNC: 7.6 G/DL (ref 6.8–8.8)
RBC # BLD AUTO: 3.96 10E12/L (ref 4.4–5.9)
SODIUM SERPL-SCNC: 134 MMOL/L (ref 133–144)
SPECIMEN SOURCE: ABNORMAL
WBC # BLD AUTO: 7.1 10E9/L (ref 4–11)

## 2017-12-20 PROCEDURE — 25000132 ZZH RX MED GY IP 250 OP 250 PS 637: Performed by: EMERGENCY MEDICINE

## 2017-12-20 PROCEDURE — 94640 AIRWAY INHALATION TREATMENT: CPT

## 2017-12-20 PROCEDURE — 83605 ASSAY OF LACTIC ACID: CPT | Performed by: EMERGENCY MEDICINE

## 2017-12-20 PROCEDURE — 87804 INFLUENZA ASSAY W/OPTIC: CPT | Performed by: EMERGENCY MEDICINE

## 2017-12-20 PROCEDURE — 93005 ELECTROCARDIOGRAM TRACING: CPT

## 2017-12-20 PROCEDURE — 25000125 ZZHC RX 250: Performed by: EMERGENCY MEDICINE

## 2017-12-20 PROCEDURE — 99285 EMERGENCY DEPT VISIT HI MDM: CPT | Mod: 25

## 2017-12-20 PROCEDURE — 71020 XR CHEST 2 VW: CPT

## 2017-12-20 PROCEDURE — 85025 COMPLETE CBC W/AUTO DIFF WBC: CPT | Performed by: EMERGENCY MEDICINE

## 2017-12-20 PROCEDURE — 80053 COMPREHEN METABOLIC PANEL: CPT | Performed by: EMERGENCY MEDICINE

## 2017-12-20 RX ORDER — ACETAMINOPHEN 325 MG/1
650 TABLET ORAL ONCE
Status: COMPLETED | OUTPATIENT
Start: 2017-12-20 | End: 2017-12-20

## 2017-12-20 RX ORDER — ALBUTEROL SULFATE 90 UG/1
2 AEROSOL, METERED RESPIRATORY (INHALATION) EVERY 6 HOURS PRN
Qty: 1 INHALER | Refills: 0 | Status: SHIPPED | OUTPATIENT
Start: 2017-12-20

## 2017-12-20 RX ORDER — IPRATROPIUM BROMIDE AND ALBUTEROL SULFATE 2.5; .5 MG/3ML; MG/3ML
3 SOLUTION RESPIRATORY (INHALATION) ONCE
Status: COMPLETED | OUTPATIENT
Start: 2017-12-20 | End: 2017-12-20

## 2017-12-20 RX ADMIN — IPRATROPIUM BROMIDE AND ALBUTEROL SULFATE 3 ML: .5; 3 SOLUTION RESPIRATORY (INHALATION) at 09:12

## 2017-12-20 RX ADMIN — ACETAMINOPHEN 650 MG: 325 TABLET, FILM COATED ORAL at 09:11

## 2017-12-20 ASSESSMENT — ENCOUNTER SYMPTOMS
SHORTNESS OF BREATH: 1
DYSURIA: 0
COUGH: 1
VOMITING: 0
CHILLS: 1
ABDOMINAL PAIN: 0
SORE THROAT: 1
FEVER: 1
NAUSEA: 1

## 2017-12-20 NOTE — ED AVS SNAPSHOT
Emergency Department    [address] [facility] [address]    Phone:  [phone]    Fax:  [phone]                                       Gasper Chacon   MRN: [id number]    Department:   Emergency Department   Date of Visit:  12/20/2017           Patient Information     Date Of Birth          1960        Your diagnoses for this visit were:     Influenza A     Cough        You were seen by Hemant Hooper MD.      Follow-up Information     Schedule an appointment as soon as possible for a visit with Luiza Bender MD.    Specialty:  Family Practice    Contact information:    [facility]  [address]  [facility] [id number] [phone]          Follow up with  Emergency Department.    Specialty:  EMERGENCY MEDICINE    Why:  If symptoms worsen    Contact information:    [address] [facility] [address] [phone]        Discharge Instructions       Discharge Instructions  Influenza    You were diagnosed today with influenza or influenza like illness.  Influenza is a respiratory (breathing) illness caused by influenza A or B viruses.  Influenza causes five primary symptoms: fever, headache, muscle aches/fatigue/malaise, sore throat and cough.  These symptoms start one to four days after you have been around a person with this illness. Influenza is spread through sneezing and coughing and can live on surfaces for several days.  It is usually contagious for 5 days but in some cases up to 10 days and often affects several family members. If you have a family member who is less than 2 years old, older than 65 years old, pregnant or has a serious medical condition, they should be seen right away by a provider to decide if they should take preventative medications. Although influenza will make you feel very ill, most patients don t require any specific treatment. An antiviral medication might be prescribed for certain groups of patients (older patients,  younger patients, and those with certain chronic medical problems).    Generally, every Emergency Department visit should have a follow-up clinic visit with either a primary or a specialty clinic/provider. Please follow-up as instructed by your emergency provider today.    Return to the Emergency Department if:    You have trouble breathing.    You develop pain in your chest.    You have signs of being dehydrated, such as dizziness or unable to urinate (pee) at least three times daily.    You are confused or severely weak.    You cannot stop vomiting (throwing up) or you cannot drink enough fluids.    In children, you should seek help if the child has any of the above or if child:    Has blue or purplish skin color.    Is so irritable that he or she does not want to be held.    Does not have tears when crying (in infants) or does not urinate at least three times daily.    Does not wake up easily.    What can I do to help myself?    Rest.    Fluids -- Drink hydrating solutions such as Gatorade  or Pedialyte  as often as you can. If you are drinking enough, you should pass urine at least every eight hours.    Tylenol  (acetaminophen) and Advil  (ibuprofen) can relieve fever, headache, and muscle aches. Do not give aspirin to children under 18 years old.     Antiviral treatment -- Antiviral medicines do not make the flu symptoms go away immediately.  They have only been shown to make the symptoms go away 12 to 24 hours sooner than they would without treatment.       Antibiotics -- Antibiotics are NOT useful for treating viral illnesses such as influenza. Antibiotics should only be used if there is a bacterial complication of the flu such as bacterial pneumonia, ear infection, or sinusitis.    Because you were diagnosed with a flu like illness you are very contagious.  This means you cannot work, attend school or  for at least 24 hours or until you no longer have a fever.  If you were given a prescription for  medicine here today, be sure to read all of the information (including the package insert) that comes with your prescription.  This will include important information about the medicine, its side effects, and any warnings that you need to know about.  The pharmacist who fills the prescription can provide more information and answer questions you may have about the medicine.  If you have questions or concerns that the pharmacist cannot address, please call or return to the Emergency Department.   Remember that you can always come back to the Emergency Department if you are not able to see your regular provider in the amount of time listed above, if you get any new symptoms, or if there is anything that worries you.      Future Appointments        Provider Department Dept Phone Center    12/21/2017 3:20 PM [facility] Physical Therapy [phone] [facility]    12/27/2017 1:20 PM [facility] [phone] [facility]    12/29/2017 1:20 PM Sudha Mack PT [facility] Physical Therapy [phone] [facility]    1/2/2018 3:20 PM Sudha Mack [facility] Physical Therapy [phone] [facility]    1/5/2018 4:40 PM Sudha Mack [facility] Physical Therapy [phone] [facility]      24 Hour Appointment Hotline       To make an appointment at any [facility], call [phone] ([phone]). If you don't have a family doctor or clinic, we will help you find one. [facility] are conveniently located to serve the needs of you and your family.             Review of your medicines      START taking        Dose / Directions Last dose taken    albuterol 108 (90 BASE) MCG/ACT Inhaler   Commonly known as:  PROAIR HFA/PROVENTIL HFA/VENTOLIN HFA   Dose:  2 puff    Quantity:  1 Inhaler        Inhale 2 puffs into the lungs every 6 hours as needed for shortness of breath / dyspnea or wheezing   Refills:  0          Our records show that you are taking the medicines listed below. If these are incorrect, please call your family doctor or clinic.        Dose / Directions Last dose taken    aspirin 325 MG tablet   Dose:  325 mg   Quantity:  20 tablet        Take 1 tablet (325 mg) by mouth 2 times daily   Refills:  0        CHLORTHALIDONE PO   Dose:  25 mg        Take 25 mg by mouth every morning   Refills:  0        diazepam 5 MG tablet   Commonly known as:  VALIUM   Dose:  5 mg   Quantity:  30 tablet        Take 1 tablet (5 mg) by mouth every 6 hours as needed for anxiety   Refills:  0        GREEN TEA EXTRACT PO   Dose:  2 capsule        Take 2 capsules by mouth every morning   Refills:  0        ketorolac 10 MG tablet   Commonly known as:  TORADOL   Dose:  10 mg   Quantity:  20 tablet        Take 1 tablet (10 mg) by mouth every 6 hours as needed   Refills:  0        LIPITOR PO   Dose:  10 mg        Take 10 mg by mouth every morning   Refills:  0        NONFORMULARY   Dose:  8 oz        Take 8 oz by mouth every morning (Patient mixes 2 Tbls lemon jiuce,2 Tbls Apple Cider Vinegar, 1 Tbls Cinnamon & 1 Tbls honey mixed in 8 ounces of water daily)   Refills:  0        order for DME   Quantity:  1 each        Equipment being ordered: Walker Wheels () and Walker () Treatment Diagnosis: difficulty walking   Refills:  0        oxyCODONE IR 5 MG tablet   Commonly known as:  ROXICODONE   Dose:  5-10 mg   Quantity:  40 tablet        Take 1-2 tablets (5-10 mg) by mouth every 3 hours as needed for moderate to severe pain   Refills:  0        prenatal multivitamin plus iron 27-0.8 MG Tabs per tablet   Dose:  1 tablet        Take 1 tablet by mouth every morning   Refills:  0        senna-docusate 8.6-50 MG per tablet   Commonly known as:  SENOKOT-S;PERICOLACE   Dose:  1-2 tablet    Quantity:  20 tablet        Take 1-2 tablets by mouth 2 times daily   Refills:  0        * TYLENOL PO   Dose:  1000 mg        Take 1,000 mg by mouth daily as needed for mild pain or fever   Refills:  0        * acetaminophen 325 MG tablet   Commonly known as:  TYLENOL   Dose:  975 mg   Quantity:  100 tablet        Take 3 tablets (975 mg) by mouth every 8 hours   Refills:  0        VITAMIN D (CHOLECALCIFEROL) PO   Dose:  5000 Units        Take 5,000 Units by mouth every morning   Refills:  0        * Notice:  This list has 2 medication(s) that are the same as other medications prescribed for you. Read the directions carefully, and ask your doctor or other care provider to review them with you.            Prescriptions were sent or printed at these locations (1 Prescription)                   Other Prescriptions                Printed at Department/Unit printer (1 of 1)         albuterol (PROAIR HFA/PROVENTIL HFA/VENTOLIN HFA) 108 (90 BASE) MCG/ACT Inhaler                Procedures and tests performed during your visit     CBC with platelets differential    Comprehensive metabolic panel    EKG 12-lead, tracing only    Influenza A/B antigen    Lactic acid    XR Chest 2 Views      Orders Needing Specimen Collection     None      Pending Results     No orders found from 12/18/2017 to 12/21/2017.            Pending Culture Results     No orders found from 12/18/2017 to 12/21/2017.            Pending Results Instructions     If you had any lab results that were not finalized at the time of your Discharge, you can call the ED Lab Result RN at [phone]. You will be contacted by this team for any positive Lab results or changes in treatment. The nurses are available 7 days a week from 10A to 6:30P.  You can leave a message 24 hours per day and they will return your call.        Test Results From Your Hospital Stay        12/20/2017  8:55 AM      Component Results     Component Value Ref Range & Units Status    WBC 7.1  4.0 - 11.0 10e9/L Final    RBC Count 3.96 (L) 4.4 - 5.9 10e12/L Final    Hemoglobin 11.2 (L) 13.3 - 17.7 g/dL Final    Hematocrit 34.9 (L) 40.0 - 53.0 % Final    MCV 88 78 - 100 fl Final    MCH 28.3 26.5 - 33.0 pg Final    MCHC 32.1 31.5 - 36.5 g/dL Final    RDW 12.9 10.0 - 15.0 % Final    Platelet Count 354 150 - 450 10e9/L Final    Diff Method Automated Method  Final    % Neutrophils 77.8 % Final    % Lymphocytes 8.6 % Final    % Monocytes 13.0 % Final    % Eosinophils 0.1 % Final    % Basophils 0.1 % Final    % Immature Granulocytes 0.4 % Final    Nucleated RBCs 0 0 /100 Final    Absolute Neutrophil 5.5 1.6 - 8.3 10e9/L Final    Absolute Lymphocytes 0.6 (L) 0.8 - 5.3 10e9/L Final    Absolute Monocytes 0.9 0.0 - 1.3 10e9/L Final    Absolute Eosinophils 0.0 0.0 - 0.7 10e9/L Final    Absolute Basophils 0.0 0.0 - 0.2 10e9/L Final    Abs Immature Granulocytes 0.0 0 - 0.4 10e9/L Final    Absolute Nucleated RBC 0.0  Final         12/20/2017  9:11 AM      Component Results     Component Value Ref Range & Units Status    Sodium 134 133 - 144 mmol/L Final    Potassium 3.5 3.4 - 5.3 mmol/L Final    Chloride 97 94 - 109 mmol/L Final    Carbon Dioxide 31 20 - 32 mmol/L Final    Anion Gap 6 3 - 14 mmol/L Final    Glucose 100 (H) 70 - 99 mg/dL Final    Urea Nitrogen 19 7 - 30 mg/dL Final    Creatinine 1.18 0.66 - 1.25 mg/dL Final    GFR Estimate 64 >60 mL/min/1.7m2 Final    Non  GFR Calc    GFR Estimate If Black 77 >60 mL/min/1.7m2 Final    African American GFR Calc    Calcium 8.8 8.5 - 10.1 mg/dL Final    Bilirubin Total 0.5 0.2 - 1.3 mg/dL Final    Albumin 3.7 3.4 - 5.0 g/dL Final    Protein Total 7.6 6.8 - 8.8 g/dL Final    Alkaline Phosphatase 67 40 - 150 U/L Final    ALT 38 0 - 70 U/L Final    AST 19 0 - 45 U/L Final         12/20/2017  9:17 AM      Component Results     Component Value Ref Range & Units Status    Influenza A/B Agn Specimen Nasopharyngeal  Final    Influenza A Positive (A) NEG^Negative  Final    Influenza B Negative NEG^Negative Final    Test results must be correlated with clinical data. If necessary, results   should be confirmed by a molecular assay or viral culture.           12/20/2017  9:58 AM      Narrative     CHEST TWO VIEWS  12/20/2017 9:10 AM    HISTORY:  Cough. Shortness of breath.    COMPARISON:  None.        Impression     IMPRESSION:  No infiltrates or acute process. S-shaped thoracolumbar  scoliosis. Otherwise negative.     RODY OSULLIVAN MD         12/20/2017  9:23 AM      Component Results     Component Value Ref Range & Units Status    Lactic Acid 1.2 0.4 - 2.0 mmol/L Final                Clinical Quality Measure: Blood Pressure Screening     Your blood pressure was checked while you were in the emergency department today. The last reading we obtained was  BP: 128/75 . Please read the guidelines below about what these numbers mean and what you should do about them.  If your systolic blood pressure (the top number) is less than 120 and your diastolic blood pressure (the bottom number) is less than 80, then your blood pressure is normal. There is nothing more that you need to do about it.  If your systolic blood pressure (the top number) is 120-139 or your diastolic blood pressure (the bottom number) is 80-89, your blood pressure may be higher than it should be. You should have your blood pressure rechecked within a year by a primary care provider.  If your systolic blood pressure (the top number) is 140 or greater or your diastolic blood pressure (the bottom number) is 90 or greater, you may have high blood pressure. High blood pressure is treatable, but if left untreated over time it can put you at risk for heart attack, stroke, or kidney failure. You should have your blood pressure rechecked by a primary care provider within the next 4 weeks.  If your provider in the emergency department today gave you specific instructions to follow-up with your doctor or provider even sooner  "than that, you should follow that instruction and not wait for up to 4 weeks for your follow-up visit.        Thank you for choosing [facility]       Thank you for choosing [facility] for your care. Our goal is always to provide you with excellent care. Hearing back from our patients is one way we can continue to improve our services. Please take a few minutes to complete the written survey that you may receive in the mail after you visit with us. Thank you!        [facility] Information     [facility] lets you send messages to your doctor, view your test results, renew your prescriptions, schedule appointments and more. To sign up, go to [email or web address] . Click on \"Log in\" on the left side of the screen, which will take you to the Welcome page. Then click on \"Sign up Now\" on the right side of the page.     You will be asked to enter the access code listed below, as well as some personal information. Please follow the directions to create your username and password.     Your access code is: [email or web address]  Expires: 3/7/2018  9:56 AM     Your access code will  in 90 days. If you need help or a new code, please call your [facility] clinic or [phone].        Care EveryWhere ID     This is your Care EveryWhere ID. This could be used by other organizations to access your [facility] medical records  [id number]        Equal Access to Services     JACKY SOTO : Hadii brando humphreyso [employer], waaxda luqadaha, qaybta kaalmada augustin, andre willis. So [facility] [phone].    ATENCIÓN: Si habla español, tiene a morales disposición servicios gratuitos de asistencia lingüística. Rafiq al [phone].    We comply with applicable federal civil rights laws and [place] laws. We do not discriminate on the basis of race, color, national origin, age, disability, sex, sexual orientation, or gender identity.            After Visit Summary       This is your record. Keep this with you and show to your " community pharmacist(s) and doctor(s) at your next visit.

## 2017-12-20 NOTE — ED PROVIDER NOTES
History     Chief Complaint:  Cough      HPI   Gasper Chacon is a 57 year old male with a history of hypertension and hyperlipidemia two weeks status-post right total knee arthroplasty performed by Dr. Crescencio Begum who presents to the emergency department today for evaluation of cough. The patient reports that he developed a productive, painful cough on Monday 12/18/17 which worsened yesterday, along with a sore throat, mild shortness of breath, congestion, and constant chills. He states that he thinks he has had a fever but did not take his temperature at home. He also notes some nausea which began this morning. He denies vomiting, abdominal pain, dysuria, recent ill contacts, smoking, or personal history of heart disease. He notes that his father had heart disease but denies familial history of myocardial infarction. He reports rare alcohol consumption. The patient reports that he was sent to the cardiologist following his pre-operative physical and had a drug-induced cardiac stress test which was normal. He states that he has had constant posterior right knee pain for five days as well as swelling around the knee. He notes that he recently started physical therapy.    Cardiac/PE/DVT Risk Factors:  History of hypertension - Positive  History of hyperlipidemia - Positive  History of diabetes - Negative  History of smoking - Negative  Personal history of PE/DVT - Negative  Family history of heart complications - Positive (Father: Heart disease)  Recent surgery - Positive    Allergies:  Pineapple  Walnuts [Nuts]     Medications:    Oxycodone  Valium  Tylenol  Aspirin  Toradol  Senna-docusate  Lipitor  Chlorthalidone  Tylenol    Past Medical History:    Abnormal ECG  Arthritis  Hyperlipidemia  Hyperplastic colon polyp  Hypertension  Sciatica    Past Surgical History:    Right total knee arthroplasty (12/05/2017)  Hernia repair (x2)  [place] teeth    Family History:    History reviewed. No pertinent family  "history.    Social History:  The patient was accompanied to the ED by his wife.  Smoking Status: Never Smoker  Smokeless Tobacco: Never Used  Alcohol Use: Positive  Marital Status:       Review of Systems   Constitutional: Positive for chills and fever (subjective).   HENT: Positive for congestion and sore throat.    Respiratory: Positive for cough (productive) and shortness of breath.    Gastrointestinal: Positive for nausea. Negative for abdominal pain and vomiting.   Genitourinary: Negative for dysuria.   Musculoskeletal:        Positive for posterior right knee pain and swelling.   All other systems reviewed and are negative.    Physical Exam     Patient Vitals for the past 24 hrs:   BP Temp Temp src Pulse Heart Rate Resp SpO2 Height Weight   12/20/17 1028 134/68 - - - 88 - - - -   12/20/17 0916 - - - - - - 100 % - -   12/20/17 0830 - - - - - - 97 % - -   12/20/17 0822 128/75 - - - - - - - -   12/20/17 0815 - 101.5  F (38.6  C) - - - - - - -   12/20/17 0733 128/81 99.7  F (37.6  C) Oral 96 - 16 98 % 1.798 m (5' 10.8\") 89.4 kg (197 lb)     Physical Exam  General: Alert, appears well-developed and well-nourished. Cooperative.     In mild distress  HEENT:  Head:  Atraumatic  Ears:  External ears are normal  Mouth/Throat:  Oropharynx is without erythema or exudate and mucous membranes are dry.   Eyes:   Conjunctivae normal and EOM are normal. No scleral icterus.    Pupils are equal, round, and reactive to light.   Neck:   Normal range of motion. Neck supple.  CV:  Normal rate, regular rhythm, normal heart sounds and radial and dorsalis pedis pulses are 2+ and symmetric.  No murmur.  Resp:  Breath sounds are coarse bilaterally. No wheezing.    Non-labored, no retractions or accessory muscle use  GI:  Abdomen is soft, no distension, no tenderness. No rebound or guarding.  MS:  Right knee with mild effusion, post-operative from total knee replacement. No surrounding erythema or streaking. No acute changes " according to patient as compared to prior 72 hours.    Normal strength in all 4 extremities.     Back atraumatic.    No midline cervical, thoracic, or lumbar tenderness  Skin:  Warm and dry.  No rash or lesions noted.  Neuro:   Alert. Normal strength.  Sensation intact in all 4 extremities. GCS: 15  Psych: Normal mood and affect.  Lymph: No anterior or posterior cervical lymphadenopathy noted.    Emergency Department Course     ECG:  ECG taken at 0859, ECG read at 0905  Normal sinus rhythm  T wave abnormality, consider ischemia - seen on prior ECGs  Abnormal ECG  Rate 83 bpm. RI interval 164 ms. QRS duration 88 ms. QT/QTc 348/408 ms. P-R-T axes 64 53 -5.    Imaging:  Radiology findings were communicated with the patient who voiced understanding of the findings.    XR Chest 2 Views  No infiltrates or acute process. S-shaped thoracolumbar  scoliosis. Otherwise negative.   RODY OSULLIVAN MD  Reading per radiology    Laboratory:  Laboratory findings were communicated with the patient who voiced understanding of the findings.    Influenza A/B antigen (nasopharyngeal): A Positive, B Negative  CBC: WBC 7.1, HGB 11.2 (L),   CMP: Glucose 100 (H), o/w WNL (Creatinine 1.18)  Lactic acid (Collected at 0815): 1.2    Interventions:  0911 Tylenol 650 mg PO  0912 Albuterol 3 mL [facility]    Emergency Department Course:  Nursing notes and vitals reviewed.  I performed an exam of the patient as documented above.   The patient was sent for a XR Chest 2 Views while in the emergency department, results above.  IV was inserted and blood was drawn for laboratory testing, results above.  I discussed the treatment plan with the patient. They expressed understanding of this plan and consented to discharge. They will be discharged home with instructions for care and follow up. In addition, the patient will return to the emergency department if their symptoms persist, worsen, if new symptoms arise or if there is any concern.  All questions  were answered.  I personally reviewed the ECG, imaging, and laboratory results with the patient and answered all related questions prior to discharge.    Impression & Plan      Medical Decision Making:  Gasper Chacon is a 57 year old male who presents for evaluation of cough.  They have other symptoms including fever, fatigue and congestion.   This is consistent with influenza. Confirmed by viral swab.  Patient is outside of the range of Tamiflu (symptoms >24 hours in otherwise healthy patient, not member of high risk group requiring oseltamivir).  Patient was given a Duoneb with improvement in cough symptoms. Although patient with no history of asthma or COPD, due to improvement with nebulizer, I will prescribe an albuterol inhaler for treatment of cough symptoms at home. They are at risk for pneumonia but no signs of this are detected on today's visit.  Close followup of primary care physician is indicated and return to the ED for high fevers > 103 for more than 48 hours more, increasing productive cough, shortness of breath, or confusion. Patient with recent total knee replacement on R.  The wound appears to be healing well and patient is followed closely by orthopedic surgeon.  Should continue with close follow up in ortho clinic.  No concern for post operative wound complications for today's presentation, as his symptoms all seem consistent with an upper respiratory infection/influenza dx.  There is no signs of serious bacterial infection such as bacteremia, meningitis, UTI/pyelonephritis, strep pharyngitis, etc.  All questions were answered prior to discharge.    Diagnosis:    ICD-10-CM    1. Influenza A J10.1    2. Cough R05      Disposition:  The patient is discharged to home.  Discharge Medications:  Discharge Medication List as of 12/20/2017 10:08 AM      START taking these medications    Details   albuterol (PROAIR HFA/PROVENTIL HFA/VENTOLIN HFA) 108 (90 BASE) MCG/ACT Inhaler Inhale 2 puffs into the  lungs every 6 hours as needed for shortness of breath / dyspnea or wheezing, Disp-1 Inhaler, R-0, Local Print           Scribe Disclosure:  I, Lester Carias, am serving as a scribe at 8:46 AM on 12/20/2017 to document services personally performed by Hemant Hooper MD based on my observations and the provider's statements to me.   EMERGENCY DEPARTMENT       Hemant Hooper MD  12/20/17 9269

## 2017-12-20 NOTE — ED AVS SNAPSHOT
Emergency Department    [address] [place] [address]    Phone:  [phone]    Fax:  [phone]                                       Gasper Chacon   MRN: [id number]    Department:   Emergency Department   Date of Visit:  12/20/2017           After Visit Summary Signature Page     I have received my discharge instructions, and my questions have been answered. I have discussed any challenges I see with this plan with the nurse or doctor.    ..........................................................................................................................................  Patient/Patient Representative Signature      ..........................................................................................................................................  Patient Representative Print Name and Relationship to Patient    ..................................................               ................................................  Date                                            Time    ..........................................................................................................................................  Reviewed by Signature/Title    ...................................................              ..............................................  Date                                                            Time

## 2017-12-20 NOTE — DISCHARGE INSTRUCTIONS
Discharge Instructions  Influenza    You were diagnosed today with influenza or influenza like illness.  Influenza is a respiratory (breathing) illness caused by influenza A or B viruses.  Influenza causes five primary symptoms: fever, headache, muscle aches/fatigue/malaise, sore throat and cough.  These symptoms start one to four days after you have been around a person with this illness. Influenza is spread through sneezing and coughing and can live on surfaces for several days.  It is usually contagious for 5 days but in some cases up to 10 days and often affects several family members. If you have a family member who is less than 2 years old, older than 65 years old, pregnant or has a serious medical condition, they should be seen right away by a provider to decide if they should take preventative medications. Although influenza will make you feel very ill, most patients don t require any specific treatment. An antiviral medication might be prescribed for certain groups of patients (older patients, younger patients, and those with certain chronic medical problems).    Generally, every Emergency Department visit should have a follow-up clinic visit with either a primary or a specialty clinic/provider. Please follow-up as instructed by your emergency provider today.    Return to the Emergency Department if:    You have trouble breathing.    You develop pain in your chest.    You have signs of being dehydrated, such as dizziness or unable to urinate (pee) at least three times daily.    You are confused or severely weak.    You cannot stop vomiting (throwing up) or you cannot drink enough fluids.    In children, you should seek help if the child has any of the above or if child:    Has blue or purplish skin color.    Is so irritable that he or she does not want to be held.    Does not have tears when crying (in infants) or does not urinate at least three times daily.    Does not wake up easily.    What can I do to  help myself?    Rest.    Fluids -- Drink hydrating solutions such as Gatorade  or Pedialyte  as often as you can. If you are drinking enough, you should pass urine at least every eight hours.    Tylenol  (acetaminophen) and Advil  (ibuprofen) can relieve fever, headache, and muscle aches. Do not give aspirin to children under 18 years old.     Antiviral treatment -- Antiviral medicines do not make the flu symptoms go away immediately.  They have only been shown to make the symptoms go away 12 to 24 hours sooner than they would without treatment.       Antibiotics -- Antibiotics are NOT useful for treating viral illnesses such as influenza. Antibiotics should only be used if there is a bacterial complication of the flu such as bacterial pneumonia, ear infection, or sinusitis.    Because you were diagnosed with a flu like illness you are very contagious.  This means you cannot work, attend school or  for at least 24 hours or until you no longer have a fever.  If you were given a prescription for medicine here today, be sure to read all of the information (including the package insert) that comes with your prescription.  This will include important information about the medicine, its side effects, and any warnings that you need to know about.  The pharmacist who fills the prescription can provide more information and answer questions you may have about the medicine.  If you have questions or concerns that the pharmacist cannot address, please call or return to the Emergency Department.   Remember that you can always come back to the Emergency Department if you are not able to see your regular provider in the amount of time listed above, if you get any new symptoms, or if there is anything that worries you.

## 2017-12-27 ENCOUNTER — THERAPY VISIT (OUTPATIENT)
Dept: PHYSICAL THERAPY | Facility: CLINIC | Age: 57
End: 2017-12-27
Payer: COMMERCIAL

## 2017-12-27 DIAGNOSIS — Z47.1 AFTERCARE FOLLOWING RIGHT KNEE JOINT REPLACEMENT SURGERY: ICD-10-CM

## 2017-12-27 DIAGNOSIS — Z96.651 AFTERCARE FOLLOWING RIGHT KNEE JOINT REPLACEMENT SURGERY: ICD-10-CM

## 2017-12-27 DIAGNOSIS — Z96.651 PRESENCE OF RIGHT ARTIFICIAL KNEE JOINT: ICD-10-CM

## 2017-12-27 PROCEDURE — 97530 THERAPEUTIC ACTIVITIES: CPT | Mod: GP | Performed by: PHYSICAL THERAPIST

## 2017-12-27 PROCEDURE — 97110 THERAPEUTIC EXERCISES: CPT | Mod: GP | Performed by: PHYSICAL THERAPIST

## 2017-12-27 ASSESSMENT — ACTIVITIES OF DAILY LIVING (ADL)
WALK: ACTIVITY IS MINIMALLY DIFFICULT
GO UP STAIRS: ACTIVITY IS MINIMALLY DIFFICULT
SIT WITH YOUR KNEE BENT: ACTIVITY IS MINIMALLY DIFFICULT
WEAKNESS: I HAVE THE SYMPTOM BUT IT DOES NOT AFFECT MY ACTIVITY
GO DOWN STAIRS: ACTIVITY IS MINIMALLY DIFFICULT
SQUAT: ACTIVITY IS NOT DIFFICULT
KNEEL ON THE FRONT OF YOUR KNEE: ACTIVITY IS MINIMALLY DIFFICULT
RAW_SCORE: 57
KNEE_ACTIVITY_OF_DAILY_LIVING_SUM: 57
LIMPING: I HAVE THE SYMPTOM BUT IT DOES NOT AFFECT MY ACTIVITY
RISE FROM A CHAIR: ACTIVITY IS MINIMALLY DIFFICULT
STAND: ACTIVITY IS MINIMALLY DIFFICULT
KNEE_ACTIVITY_OF_DAILY_LIVING_SCORE: 81.43
HOW_WOULD_YOU_RATE_THE_CURRENT_FUNCTION_OF_YOUR_KNEE_DURING_YOUR_USUAL_DAILY_ACTIVITIES_ON_A_SCALE_FROM_0_TO_100_WITH_100_BEING_YOUR_LEVEL_OF_KNEE_FUNCTION_PRIOR_TO_YOUR_INJURY_AND_0_BEING_THE_INABILITY_TO_PERFORM_ANY_OF_YOUR_USUAL_DAILY_ACTIVITIES?: 95
HOW_WOULD_YOU_RATE_THE_OVERALL_FUNCTION_OF_YOUR_KNEE_DURING_YOUR_USUAL_DAILY_ACTIVITIES?: NEARLY NORMAL
PAIN: I HAVE THE SYMPTOM BUT IT DOES NOT AFFECT MY ACTIVITY
STIFFNESS: I HAVE THE SYMPTOM BUT IT DOES NOT AFFECT MY ACTIVITY
SWELLING: I HAVE THE SYMPTOM BUT IT DOES NOT AFFECT MY ACTIVITY
GIVING WAY, BUCKLING OR SHIFTING OF KNEE: I HAVE THE SYMPTOM BUT IT DOES NOT AFFECT MY ACTIVITY
AS_A_RESULT_OF_YOUR_KNEE_INJURY,_HOW_WOULD_YOU_RATE_YOUR_CURRENT_LEVEL_OF_DAILY_ACTIVITY?: NEARLY NORMAL

## 2017-12-29 ENCOUNTER — THERAPY VISIT (OUTPATIENT)
Dept: PHYSICAL THERAPY | Facility: CLINIC | Age: 57
End: 2017-12-29
Payer: COMMERCIAL

## 2017-12-29 DIAGNOSIS — Z96.651 PRESENCE OF RIGHT ARTIFICIAL KNEE JOINT: ICD-10-CM

## 2017-12-29 DIAGNOSIS — Z47.1 AFTERCARE FOLLOWING RIGHT KNEE JOINT REPLACEMENT SURGERY: ICD-10-CM

## 2017-12-29 DIAGNOSIS — Z96.651 AFTERCARE FOLLOWING RIGHT KNEE JOINT REPLACEMENT SURGERY: ICD-10-CM

## 2017-12-29 PROCEDURE — 97530 THERAPEUTIC ACTIVITIES: CPT | Mod: GP | Performed by: PHYSICAL THERAPIST

## 2017-12-29 PROCEDURE — 97010 HOT OR COLD PACKS THERAPY: CPT | Mod: GP | Performed by: PHYSICAL THERAPIST

## 2017-12-29 PROCEDURE — 97110 THERAPEUTIC EXERCISES: CPT | Mod: GP | Performed by: PHYSICAL THERAPIST

## 2018-01-02 ENCOUNTER — THERAPY VISIT (OUTPATIENT)
Dept: PHYSICAL THERAPY | Facility: CLINIC | Age: 58
End: 2018-01-02
Payer: COMMERCIAL

## 2018-01-02 DIAGNOSIS — Z96.651 PRESENCE OF RIGHT ARTIFICIAL KNEE JOINT: ICD-10-CM

## 2018-01-02 DIAGNOSIS — Z96.651 AFTERCARE FOLLOWING RIGHT KNEE JOINT REPLACEMENT SURGERY: ICD-10-CM

## 2018-01-02 DIAGNOSIS — Z47.1 AFTERCARE FOLLOWING RIGHT KNEE JOINT REPLACEMENT SURGERY: ICD-10-CM

## 2018-01-02 PROCEDURE — 97530 THERAPEUTIC ACTIVITIES: CPT | Mod: GP | Performed by: PHYSICAL THERAPIST

## 2018-01-02 PROCEDURE — 97110 THERAPEUTIC EXERCISES: CPT | Mod: GP | Performed by: PHYSICAL THERAPIST

## 2018-01-05 ENCOUNTER — THERAPY VISIT (OUTPATIENT)
Dept: PHYSICAL THERAPY | Facility: CLINIC | Age: 58
End: 2018-01-05
Payer: COMMERCIAL

## 2018-01-05 DIAGNOSIS — Z47.1 AFTERCARE FOLLOWING RIGHT KNEE JOINT REPLACEMENT SURGERY: ICD-10-CM

## 2018-01-05 DIAGNOSIS — Z96.651 PRESENCE OF RIGHT ARTIFICIAL KNEE JOINT: ICD-10-CM

## 2018-01-05 DIAGNOSIS — Z96.651 AFTERCARE FOLLOWING RIGHT KNEE JOINT REPLACEMENT SURGERY: ICD-10-CM

## 2018-01-05 PROCEDURE — 97110 THERAPEUTIC EXERCISES: CPT | Mod: GP | Performed by: PHYSICAL THERAPIST

## 2018-01-05 PROCEDURE — 97530 THERAPEUTIC ACTIVITIES: CPT | Mod: GP | Performed by: PHYSICAL THERAPIST

## 2018-01-05 ASSESSMENT — ACTIVITIES OF DAILY LIVING (ADL)
PAIN: I HAVE THE SYMPTOM BUT IT DOES NOT AFFECT MY ACTIVITY
HOW_WOULD_YOU_RATE_THE_CURRENT_FUNCTION_OF_YOUR_KNEE_DURING_YOUR_USUAL_DAILY_ACTIVITIES_ON_A_SCALE_FROM_0_TO_100_WITH_100_BEING_YOUR_LEVEL_OF_KNEE_FUNCTION_PRIOR_TO_YOUR_INJURY_AND_0_BEING_THE_INABILITY_TO_PERFORM_ANY_OF_YOUR_USUAL_DAILY_ACTIVITIES?: 0
RISE FROM A CHAIR: ACTIVITY IS NOT DIFFICULT
GIVING WAY, BUCKLING OR SHIFTING OF KNEE: I HAVE THE SYMPTOM BUT IT DOES NOT AFFECT MY ACTIVITY
HOW_WOULD_YOU_RATE_THE_OVERALL_FUNCTION_OF_YOUR_KNEE_DURING_YOUR_USUAL_DAILY_ACTIVITIES?: NEARLY NORMAL
AS_A_RESULT_OF_YOUR_KNEE_INJURY,_HOW_WOULD_YOU_RATE_YOUR_CURRENT_LEVEL_OF_DAILY_ACTIVITY?: NEARLY NORMAL
GO DOWN STAIRS: ACTIVITY IS SOMEWHAT DIFFICULT
RAW_SCORE: 59
WEAKNESS: I DO NOT HAVE THE SYMPTOM
KNEE_ACTIVITY_OF_DAILY_LIVING_SUM: 59
SIT WITH YOUR KNEE BENT: ACTIVITY IS MINIMALLY DIFFICULT
STAND: ACTIVITY IS NOT DIFFICULT
WALK: ACTIVITY IS MINIMALLY DIFFICULT
GO UP STAIRS: ACTIVITY IS MINIMALLY DIFFICULT
LIMPING: I HAVE THE SYMPTOM BUT IT DOES NOT AFFECT MY ACTIVITY
KNEEL ON THE FRONT OF YOUR KNEE: ACTIVITY IS MINIMALLY DIFFICULT
SQUAT: ACTIVITY IS NOT DIFFICULT
KNEE_ACTIVITY_OF_DAILY_LIVING_SCORE: 84.29
STIFFNESS: I HAVE THE SYMPTOM BUT IT DOES NOT AFFECT MY ACTIVITY
SWELLING: I HAVE THE SYMPTOM BUT IT DOES NOT AFFECT MY ACTIVITY

## 2018-01-09 ENCOUNTER — THERAPY VISIT (OUTPATIENT)
Dept: PHYSICAL THERAPY | Facility: CLINIC | Age: 58
End: 2018-01-09
Payer: COMMERCIAL

## 2018-01-09 DIAGNOSIS — Z47.1 AFTERCARE FOLLOWING RIGHT KNEE JOINT REPLACEMENT SURGERY: Primary | ICD-10-CM

## 2018-01-09 DIAGNOSIS — Z96.651 PRESENCE OF RIGHT ARTIFICIAL KNEE JOINT: ICD-10-CM

## 2018-01-09 DIAGNOSIS — Z96.651 AFTERCARE FOLLOWING RIGHT KNEE JOINT REPLACEMENT SURGERY: Primary | ICD-10-CM

## 2018-01-09 PROCEDURE — 97110 THERAPEUTIC EXERCISES: CPT | Mod: GP | Performed by: PHYSICAL THERAPIST

## 2018-01-09 PROCEDURE — 97530 THERAPEUTIC ACTIVITIES: CPT | Mod: GP | Performed by: PHYSICAL THERAPIST

## 2018-01-09 NOTE — LETTER
[facility] PHYSICAL THERAPY  [address] [address]  [phone]    2018    Re: Gasper Chacon   :   1960  MRN:  [id number]   REFERRING PHYSICIAN:   Luis Enrique Begum    [facility] PHYSICAL [facility]    Date of Initial Evaluation:  2017  Visits:  Rxs Used: 8  Reason for Referral:     Aftercare following right knee joint replacement surgery  Presence of right artificial knee joint    DISCHARGE REPORT  Progress reporting period is from 17 to 18.       SUBJECTIVE  Subjective changes noted by patient:  Pt reported follow-up with surgeon on 18; returns to work on 18 without restriction. Overall pleased with progress.    Current pain level is 0/10.   Previous pain level was 2-3/10.  Changes in function:  Yes (See Goal flowsheet attached for changes in current functional level)  Adverse reaction to treatment or activity: None    OBJECTIVE  Objective: Gait-ambulates without AD without deviation. Able to ascend/descend stairs with a reciprocal gait.  AROM: flexion-120 degrees; extension-0 degrees.      ASSESSMENT/PLAN  Updated problem list and treatment plan: Diagnosis 1:  S/p R TKA    STG/LTGs have been met or progress has been made towards goals:  Yes (See Goal flow sheet completed today.)  Assessment of Progress: The patient has met all of their long term goals.  Self Management Plans:  Patient is independent in a home treatment program.  I have re-evaluated this patient and find that the nature, scope, duration and intensity of the therapy is appropriate for the medical condition of the patient.  Gasper continues to require the following intervention to meet STG and LTG's:  PT intervention is no longer required to meet STG/LTG.    Recommendations:  This patient is ready to be discharged from therapy and continue their home treatment program.        Thank you for your  referral.    INQUIRIES  Therapist: Sudha Mack, PT   [facility] PH[facility]ICAL THERAPY  [address] [place] [address]  Phone: [phone]  Fax: [phone]

## 2018-01-09 NOTE — PROGRESS NOTES
Subjective:  HPI                    Objective:  System    Physical Exam    General     ROS    Assessment/Plan:    DISCHARGE REPORT    Progress reporting period is from 12-8-17 to 1-9-18.       SUBJECTIVE  Subjective changes noted by patient:  Pt reported follow-up with surgeon on 1-25-18; returns to work on 1-22-18 without restriction. Overall pleased with progress.    Current pain level is 0/10.   Previous pain level was 2-3/10.  Changes in function:  Yes (See Goal flowsheet attached for changes in current functional level)  Adverse reaction to treatment or activity: None    OBJECTIVE  Objective: Gait-ambulates without AD without deviation. Able to ascend/descend stairs with a reciprocal gait.  AROM: flexion-120 degrees; extension-0 degrees.      ASSESSMENT/PLAN  Updated problem list and treatment plan: Diagnosis 1:  S/p R TKA    STG/LTGs have been met or progress has been made towards goals:  Yes (See Goal flow sheet completed today.)  Assessment of Progress: The patient has met all of their long term goals.  Self Management Plans:  Patient is independent in a home treatment program.  I have re-evaluated this patient and find that the nature, scope, duration and intensity of the therapy is appropriate for the medical condition of the patient.  Gasper continues to require the following intervention to meet STG and LTG's:  PT intervention is no longer required to meet STG/LTG.    Recommendations:  This patient is ready to be discharged from therapy and continue their home treatment program.    Please refer to the daily flowsheet for treatment today, total treatment time and time spent performing 1:1 timed codes.

## 2018-01-09 NOTE — MR AVS SNAPSHOT
"              After Visit Summary   1/9/2018    Gasper Chacon    MRN: [id number]           Patient Information     Date Of Birth          1960        Visit Information        Provider Department      1/9/2018 10:10 AM Sudha Mack PT [facility] Physical Therapy        Today's Diagnoses     Aftercare following right knee joint replacement surgery    -  1    Presence of right artificial knee joint           Follow-ups after your visit        Your next 10 appointments already scheduled     Jan 12, 2018  1:20 PM CST   CARLOS Extremity with Sudha Mack PT   [facility] Physical Therapy ([facility]  )    [address] [id number] [phone]              Who to contact     If you have questions or need follow up information about today's clinic visit or your schedule please contact [facility]note 3[facility] PHYSICAL THERAPY directly at [phone].  Normal or non-critical lab and imaging results will be communicated to you by MyChart, letter or phone within 4 business days after the clinic has received the results. If you do not hear from us within 7 days, please contact the clinic through [employer] or phone. If you have a critical or abnormal lab result, we will notify you by phone as soon as possible.  Submit refill requests through [employer] or call your pharmacy and they will forward the refill request to us. Please allow 3 business days for your refill to be completed.          Additional Information About Your Visit        [employer] Information     [employer] lets you send messages to your doctor, view your test results, renew your prescriptions, schedule appointments and more. To sign up, go to [email or web address] . Click on \"Log in\" on the left side of the screen, which will take you to the Welcome page. Then click on \"Sign up Now\" on the right side of the page.     You will " be asked to enter the access code listed below, as well as some personal information. Please follow the directions to create your username and password.     Your access code is: [email or web address]  Expires: 3/7/2018  9:56 AM     Your access code will  in 90 days. If you need help or a new code, please call your [place] clinic or [phone].        Care EveryWhere ID     This is your Care EveryWhere ID. This could be used by other organizations to access your [place] medical records  [id number]         Blood Pressure from Last 3 Encounters:   17 134/68   17 123/72    Weight from Last 3 Encounters:   17 89.4 kg (197 lb)   17 89.4 kg (197 lb 1.5 oz)              We Performed the Following     CARLOS Progress Notes Report     Therapeutic Activities     Therapeutic Exercises        Primary Care Provider Office Phone # Fax #    Luiza Bender [phone]       [facility] [address] [place] [address]        Equal Access to Services     [facility]: Hadii aad ku hadasho Soomaali, waaxda luqadaha, qaybta kaalmada adeegyada, waxay jeffrey haysanjeev woods . So [facility] [phone].    ATENCIÓN: Si habla español, tiene a morales disposición servicios gratuitos de asistencia lingüística. Rafiq al [phone].    We comply with applicable federal civil rights laws and [place] laws. We do not discriminate on the basis of race, color, national origin, age, disability, sex, sexual orientation, or gender identity.            Thank you!     Thank you for choosing [facility] PHYSICAL THERAPY  for your care. Our goal is always to provide you with excellent care. Hearing back from our patients is one way we can continue to improve our services. Please take a few minutes to complete the written survey that you may receive in the mail after your visit with us. Thank you!             Your Updated Medication List - Protect others around you:  Learn how to safely use, store and throw away your medicines at www.disposemymeds.org.          This list is accurate as of: 1/9/18 12:52 PM.  Always use your most recent med list.                   Brand Name Dispense Instructions for use Diagnosis    albuterol 108 (90 BASE) MCG/ACT Inhaler    PROAIR HFA/PROVENTIL HFA/VENTOLIN HFA    1 Inhaler    Inhale 2 puffs into the lungs every 6 hours as needed for shortness of breath / dyspnea or wheezing        aspirin 325 MG tablet     20 tablet    Take 1 tablet (325 mg) by mouth 2 times daily    History of arthroplasty of right knee       CHLORTHALIDONE PO      Take 25 mg by mouth every morning        diazepam 5 MG tablet    VALIUM    30 tablet    Take 1 tablet (5 mg) by mouth every 6 hours as needed for anxiety    History of arthroplasty of right knee       GREEN TEA EXTRACT PO      Take 2 capsules by mouth every morning        ketorolac 10 MG tablet    TORADOL    20 tablet    Take 1 tablet (10 mg) by mouth every 6 hours as needed    History of arthroplasty of right knee       LIPITOR PO      Take 10 mg by mouth every morning        NONFORMULARY      Take 8 oz by mouth every morning (Patient mixes 2 Tbls lemon jiuce,2 Tbls Apple Cider Vinegar, 1 Tbls Cinnamon & 1 Tbls honey mixed in 8 ounces of water daily)        order for DME     1 each    Equipment being ordered: Walker Wheels () and Walker () Treatment Diagnosis: difficulty walking    History of arthroplasty of right knee       oxyCODONE IR 5 MG tablet    ROXICODONE    40 tablet    Take 1-2 tablets (5-10 mg) by mouth every 3 hours as needed for moderate to severe pain    History of arthroplasty of right knee       prenatal multivitamin plus iron 27-0.8 MG Tabs per tablet      Take 1 tablet by mouth every morning        senna-docusate 8.6-50 MG per tablet    SENOKOT-S;PERICOLACE    20 tablet    Take 1-2 tablets by mouth 2 times daily    History of arthroplasty of right knee       * TYLENOL PO      Take 1,000  mg by mouth daily as needed for mild pain or fever        * acetaminophen 325 MG tablet    TYLENOL    100 tablet    Take 3 tablets (975 mg) by mouth every 8 hours    History of arthroplasty of right knee       VITAMIN D (CHOLECALCIFEROL) PO      Take 5,000 Units by mouth every morning        * Notice:  This list has 2 medication(s) that are the same as other medications prescribed for you. Read the directions carefully, and ask your doctor or other care provider to review them with you.

## 2018-05-24 PROBLEM — Z47.1 AFTERCARE FOLLOWING JOINT REPLACEMENT: Status: RESOLVED | Noted: 2017-12-08 | Resolved: 2018-05-24

## 2018-05-24 PROBLEM — Z96.651 PRESENCE OF RIGHT ARTIFICIAL KNEE JOINT: Status: RESOLVED | Noted: 2017-12-08 | Resolved: 2018-05-24

## (undated) DEVICE — SOL NACL 0.9% IRRIG 1000ML BOTTLE 07138-09

## (undated) DEVICE — SU ETHIBOND 2 V-37 4X30" MX69G

## (undated) DEVICE — PACK TOTAL KNEE SOP15TKFSD

## (undated) DEVICE — BONE CEMENT MIXEVAC III HI VAC KIT  0206-015-000

## (undated) DEVICE — LINEN TOWEL PACK X5 5464

## (undated) DEVICE — GLOVE PROTEXIS BLUE W/NEU-THERA 6.5  2D73EB65

## (undated) DEVICE — DRSG ADAPTIC 3X8" 6113

## (undated) DEVICE — MANIFOLD NEPTUNE 4 PORT 700-20

## (undated) DEVICE — IMM KNEE 20" 0814-2660

## (undated) DEVICE — SUCTION IRR SYSTEM W/O TIP INTERPULSE HANDPIECE 0210-100-000

## (undated) DEVICE — CAST PADDING 6" UNSTERILE 9046

## (undated) DEVICE — BNDG ELASTIC 4"X5YDS UNSTERILE 6611-40

## (undated) DEVICE — DRSG ABDOMINAL 07 1/2X8" 7197D

## (undated) DEVICE — WRAP EZY KNEE

## (undated) DEVICE — BLADE SAW SAGITTAL STRK 25X79.5X1.24MM 4/2000 2108-318-000

## (undated) DEVICE — CAST PADDING 6" STERILE 9046S

## (undated) DEVICE — BLADE SAW SAGITTAL STRK 19.5X95X1.27MM 2108-109-000S15

## (undated) DEVICE — GLOVE PROTEXIS POWDER FREE 6.5 ORTHOPEDIC 2D73ET65

## (undated) DEVICE — PREP CHLORAPREP 26ML TINTED ORANGE  260815

## (undated) DEVICE — SYR 30ML LL W/O NDL

## (undated) DEVICE — GLOVE PROTEXIS POWDER FREE 8.0 ORTHOPEDIC 2D73ET80

## (undated) DEVICE — BONE CLEANING TIP INTERPULSE  0210-010-000

## (undated) DEVICE — SU VICRYL 2-0 CT-1 27" UND J259H

## (undated) DEVICE — ESU GROUND PAD UNIVERSAL W/O CORD

## (undated) DEVICE — GLOVE PROTEXIS W/NEU-THERA 7.5  2D73TE75

## (undated) RX ORDER — HYDROMORPHONE HYDROCHLORIDE 1 MG/ML
INJECTION, SOLUTION INTRAMUSCULAR; INTRAVENOUS; SUBCUTANEOUS
Status: DISPENSED
Start: 2017-12-05

## (undated) RX ORDER — CEFAZOLIN SODIUM 2 G/100ML
INJECTION, SOLUTION INTRAVENOUS
Status: DISPENSED
Start: 2017-12-05

## (undated) RX ORDER — FENTANYL CITRATE 50 UG/ML
INJECTION, SOLUTION INTRAMUSCULAR; INTRAVENOUS
Status: DISPENSED
Start: 2017-12-05

## (undated) RX ORDER — ONDANSETRON 2 MG/ML
INJECTION INTRAMUSCULAR; INTRAVENOUS
Status: DISPENSED
Start: 2017-12-05

## (undated) RX ORDER — KETOROLAC TROMETHAMINE 30 MG/ML
INJECTION, SOLUTION INTRAMUSCULAR; INTRAVENOUS
Status: DISPENSED
Start: 2017-12-05

## (undated) RX ORDER — PROPOFOL 10 MG/ML
INJECTION, EMULSION INTRAVENOUS
Status: DISPENSED
Start: 2017-12-05